# Patient Record
Sex: MALE | Race: BLACK OR AFRICAN AMERICAN | NOT HISPANIC OR LATINO | URBAN - METROPOLITAN AREA
[De-identification: names, ages, dates, MRNs, and addresses within clinical notes are randomized per-mention and may not be internally consistent; named-entity substitution may affect disease eponyms.]

---

## 2021-03-11 ENCOUNTER — INPATIENT (INPATIENT)
Facility: HOSPITAL | Age: 41
LOS: 4 days | Discharge: HOME | DRG: 177 | End: 2021-03-16
Attending: INTERNAL MEDICINE | Admitting: INTERNAL MEDICINE
Payer: COMMERCIAL

## 2021-03-11 VITALS
RESPIRATION RATE: 18 BRPM | OXYGEN SATURATION: 98 % | WEIGHT: 304.02 LBS | HEART RATE: 93 BPM | TEMPERATURE: 98 F | SYSTOLIC BLOOD PRESSURE: 130 MMHG | DIASTOLIC BLOOD PRESSURE: 92 MMHG | HEIGHT: 75 IN

## 2021-03-11 DIAGNOSIS — E11.9 TYPE 2 DIABETES MELLITUS WITHOUT COMPLICATIONS: ICD-10-CM

## 2021-03-11 DIAGNOSIS — J96.01 ACUTE RESPIRATORY FAILURE WITH HYPOXIA: ICD-10-CM

## 2021-03-11 DIAGNOSIS — R09.02 HYPOXEMIA: ICD-10-CM

## 2021-03-11 DIAGNOSIS — Z29.9 ENCOUNTER FOR PROPHYLACTIC MEASURES, UNSPECIFIED: ICD-10-CM

## 2021-03-11 LAB
ALBUMIN SERPL ELPH-MCNC: 3 G/DL — LOW (ref 3.5–5)
ALP SERPL-CCNC: 54 U/L — SIGNIFICANT CHANGE UP (ref 40–120)
ALT FLD-CCNC: 52 U/L DA — SIGNIFICANT CHANGE UP (ref 10–60)
ANION GAP SERPL CALC-SCNC: 10 MMOL/L — SIGNIFICANT CHANGE UP (ref 5–17)
AST SERPL-CCNC: 62 U/L — HIGH (ref 10–40)
BASOPHILS # BLD AUTO: 0.01 K/UL — SIGNIFICANT CHANGE UP (ref 0–0.2)
BASOPHILS NFR BLD AUTO: 0.1 % — SIGNIFICANT CHANGE UP (ref 0–2)
BILIRUB SERPL-MCNC: 0.4 MG/DL — SIGNIFICANT CHANGE UP (ref 0.2–1.2)
BUN SERPL-MCNC: 12 MG/DL — SIGNIFICANT CHANGE UP (ref 7–18)
CALCIUM SERPL-MCNC: 9.2 MG/DL — SIGNIFICANT CHANGE UP (ref 8.4–10.5)
CHLORIDE SERPL-SCNC: 98 MMOL/L — SIGNIFICANT CHANGE UP (ref 96–108)
CO2 SERPL-SCNC: 24 MMOL/L — SIGNIFICANT CHANGE UP (ref 22–31)
CREAT SERPL-MCNC: 1.16 MG/DL — SIGNIFICANT CHANGE UP (ref 0.5–1.3)
D DIMER BLD IA.RAPID-MCNC: 185 NG/ML DDU — SIGNIFICANT CHANGE UP
EOSINOPHIL # BLD AUTO: 0.13 K/UL — SIGNIFICANT CHANGE UP (ref 0–0.5)
EOSINOPHIL NFR BLD AUTO: 1.7 % — SIGNIFICANT CHANGE UP (ref 0–6)
GLUCOSE SERPL-MCNC: 256 MG/DL — HIGH (ref 70–99)
HCT VFR BLD CALC: 43.2 % — SIGNIFICANT CHANGE UP (ref 39–50)
HCT VFR BLD CALC: 46.8 % — SIGNIFICANT CHANGE UP (ref 39–50)
HGB BLD-MCNC: 14.3 G/DL — SIGNIFICANT CHANGE UP (ref 13–17)
HGB BLD-MCNC: 15.5 G/DL — SIGNIFICANT CHANGE UP (ref 13–17)
IMM GRANULOCYTES NFR BLD AUTO: 0.4 % — SIGNIFICANT CHANGE UP (ref 0–1.5)
LYMPHOCYTES # BLD AUTO: 1.04 K/UL — SIGNIFICANT CHANGE UP (ref 1–3.3)
LYMPHOCYTES # BLD AUTO: 13.3 % — SIGNIFICANT CHANGE UP (ref 13–44)
MCHC RBC-ENTMCNC: 29 PG — SIGNIFICANT CHANGE UP (ref 27–34)
MCHC RBC-ENTMCNC: 29.2 PG — SIGNIFICANT CHANGE UP (ref 27–34)
MCHC RBC-ENTMCNC: 33.1 GM/DL — SIGNIFICANT CHANGE UP (ref 32–36)
MCHC RBC-ENTMCNC: 33.1 GM/DL — SIGNIFICANT CHANGE UP (ref 32–36)
MCV RBC AUTO: 87.6 FL — SIGNIFICANT CHANGE UP (ref 80–100)
MCV RBC AUTO: 88.1 FL — SIGNIFICANT CHANGE UP (ref 80–100)
MONOCYTES # BLD AUTO: 0.44 K/UL — SIGNIFICANT CHANGE UP (ref 0–0.9)
MONOCYTES NFR BLD AUTO: 5.6 % — SIGNIFICANT CHANGE UP (ref 2–14)
NEUTROPHILS # BLD AUTO: 6.18 K/UL — SIGNIFICANT CHANGE UP (ref 1.8–7.4)
NEUTROPHILS NFR BLD AUTO: 78.9 % — HIGH (ref 43–77)
NRBC # BLD: 0 /100 WBCS — SIGNIFICANT CHANGE UP (ref 0–0)
PLATELET # BLD AUTO: 157 K/UL — SIGNIFICANT CHANGE UP (ref 150–400)
PLATELET # BLD AUTO: SIGNIFICANT CHANGE UP K/UL (ref 150–400)
POTASSIUM SERPL-MCNC: 4.4 MMOL/L — SIGNIFICANT CHANGE UP (ref 3.5–5.3)
POTASSIUM SERPL-SCNC: 4.4 MMOL/L — SIGNIFICANT CHANGE UP (ref 3.5–5.3)
PROT SERPL-MCNC: 8.1 G/DL — SIGNIFICANT CHANGE UP (ref 6–8.3)
RBC # BLD: 4.93 M/UL — SIGNIFICANT CHANGE UP (ref 4.2–5.8)
RBC # BLD: 5.31 M/UL — SIGNIFICANT CHANGE UP (ref 4.2–5.8)
RBC # FLD: 13.2 % — SIGNIFICANT CHANGE UP (ref 10.3–14.5)
RBC # FLD: 13.4 % — SIGNIFICANT CHANGE UP (ref 10.3–14.5)
SARS-COV-2 RNA SPEC QL NAA+PROBE: DETECTED
SODIUM SERPL-SCNC: 132 MMOL/L — LOW (ref 135–145)
TROPONIN I SERPL-MCNC: <0.015 NG/ML — SIGNIFICANT CHANGE UP (ref 0–0.04)
WBC # BLD: 7.15 K/UL — SIGNIFICANT CHANGE UP (ref 3.8–10.5)
WBC # BLD: 7.83 K/UL — SIGNIFICANT CHANGE UP (ref 3.8–10.5)
WBC # FLD AUTO: 7.15 K/UL — SIGNIFICANT CHANGE UP (ref 3.8–10.5)
WBC # FLD AUTO: 7.83 K/UL — SIGNIFICANT CHANGE UP (ref 3.8–10.5)

## 2021-03-11 PROCEDURE — 71045 X-RAY EXAM CHEST 1 VIEW: CPT | Mod: 26

## 2021-03-11 PROCEDURE — 99285 EMERGENCY DEPT VISIT HI MDM: CPT

## 2021-03-11 PROCEDURE — 99223 1ST HOSP IP/OBS HIGH 75: CPT

## 2021-03-11 RX ORDER — DEXAMETHASONE 0.5 MG/5ML
6 ELIXIR ORAL DAILY
Refills: 0 | Status: DISCONTINUED | OUTPATIENT
Start: 2021-03-11 | End: 2021-03-16

## 2021-03-11 RX ORDER — ENOXAPARIN SODIUM 100 MG/ML
40 INJECTION SUBCUTANEOUS DAILY
Refills: 0 | Status: DISCONTINUED | OUTPATIENT
Start: 2021-03-11 | End: 2021-03-12

## 2021-03-11 RX ORDER — INSULIN LISPRO 100/ML
VIAL (ML) SUBCUTANEOUS
Refills: 0 | Status: DISCONTINUED | OUTPATIENT
Start: 2021-03-11 | End: 2021-03-16

## 2021-03-11 RX ORDER — REMDESIVIR 5 MG/ML
100 INJECTION INTRAVENOUS EVERY 24 HOURS
Refills: 0 | Status: DISCONTINUED | OUTPATIENT
Start: 2021-03-11 | End: 2021-03-11

## 2021-03-11 RX ORDER — SODIUM CHLORIDE 9 MG/ML
1000 INJECTION INTRAMUSCULAR; INTRAVENOUS; SUBCUTANEOUS ONCE
Refills: 0 | Status: COMPLETED | OUTPATIENT
Start: 2021-03-11 | End: 2021-03-11

## 2021-03-11 RX ORDER — ACETAMINOPHEN 500 MG
975 TABLET ORAL ONCE
Refills: 0 | Status: COMPLETED | OUTPATIENT
Start: 2021-03-11 | End: 2021-03-11

## 2021-03-11 RX ORDER — KETOROLAC TROMETHAMINE 30 MG/ML
15 SYRINGE (ML) INJECTION ONCE
Refills: 0 | Status: DISCONTINUED | OUTPATIENT
Start: 2021-03-11 | End: 2021-03-11

## 2021-03-11 RX ADMIN — Medication 975 MILLIGRAM(S): at 17:50

## 2021-03-11 RX ADMIN — Medication 15 MILLIGRAM(S): at 17:50

## 2021-03-11 RX ADMIN — Medication 975 MILLIGRAM(S): at 19:00

## 2021-03-11 RX ADMIN — Medication 100 MILLIGRAM(S): at 17:50

## 2021-03-11 RX ADMIN — SODIUM CHLORIDE 1000 MILLILITER(S): 9 INJECTION INTRAMUSCULAR; INTRAVENOUS; SUBCUTANEOUS at 17:51

## 2021-03-11 RX ADMIN — Medication 15 MILLIGRAM(S): at 18:20

## 2021-03-11 NOTE — H&P ADULT - PROBLEM SELECTOR PLAN 1
p/w fever, cough and SOB  COVID positive  CXR shows b/l infiltrates(f/u official read)  Troponin negative  D-dimer wnl  Saturating 96% on 2L NC  started on Decadron  f/u Procalcitonin, CRP, ferritin  Monitor oxygen saturation p/w fever and cough   COVID positive  CXR shows b/l infiltrates(f/u official read)  Troponin negative  D-dimer wnl  Saturating 96% on 2L NC  started on Decadron  f/u Procalcitonin, CRP, ferritin  Monitor oxygen saturation

## 2021-03-11 NOTE — H&P ADULT - ATTENDING COMMENTS
Pt seen and examined  Case discussed with MAR.  Agree with HPI as above    40 year old man with PMH of DM2 here on account of 1 week of progressive Pt seen and examined  Case discussed with MAR.  Agree with HPI as above    40 year old man with PMH of DM2 here on account of 1 week of progressive SOB , fever and malaise. He was noted with low SaO2 in his PCP's office and sent to the ED.    Vital Signs Last 24 Hrs  T(C): 37.4 (11 Mar 2021 19:29), Max: 37.4 (11 Mar 2021 19:29)  T(F): 99.3 (11 Mar 2021 19:29), Max: 99.3 (11 Mar 2021 19:29)  HR: 75 (11 Mar 2021 19:29) (75 - 93)  BP: 121/80 (11 Mar 2021 19:29) (121/80 - 130/92)  RR: 19 (11 Mar 2021 19:29) (18 - 19)  SpO2: 93% (11 Mar 2021 19:29) (91% - 98%)    Exams    Labs                        14.3   7.83  )-----------( 157      ( 11 Mar 2021 19:38 )             43.2       03-11    132<L>  |  98  |  12  ------------------------<  256<H>  4.4   |  24  |  1.16    Ca    9.2      11 Mar 2021 17:47    TPro  8.1  /  Alb  3.0<L>  /  TBili  0.4  /  DBili  x   /  AST  62<H>  /  ALT  52  /  AlkPhos  54  03-11    COVID-19 PCR: Detected (11 Mar 2021 17:47)    cxr - independent assessment  Extensive  B/L interstitial infiltrates    Impression  - Pt seen and examined  Case discussed with MAR.  Agree with HPI as above    40 year old man with PMH of DM2 here on account of 1 week of progressive SOB , fever and malaise. He was noted with low SaO2 in his PCP's office and sent to the ED.    Vital Signs Last 24 Hrs  T(C): 37.4 (11 Mar 2021 19:29), Max: 37.4 (11 Mar 2021 19:29)  T(F): 99.3 (11 Mar 2021 19:29), Max: 99.3 (11 Mar 2021 19:29)  HR: 75 (11 Mar 2021 19:29) (75 - 93)  BP: 121/80 (11 Mar 2021 19:29) (121/80 - 130/92)  RR: 19 (11 Mar 2021 19:29) (18 - 19)  SpO2: 93% (11 Mar 2021 19:29) (91% - 98%)    Exams  Young man , lying in bed not in acute distress with SaO2 93% at rest but drops just with slight exertion.  Improves to 96% on 2LPM.  B/L coarse crackles in both lung fields  RRR S1S2 only    Labs                        14.3   7.83  )-----------( 157      ( 11 Mar 2021 19:38 )             43.2       03-11    132<L>  |  98  |  12  ------------------------<  256<H>  4.4   |  24  |  1.16    Ca    9.2      11 Mar 2021 17:47    TPro  8.1  /  Alb  3.0<L>  /  TBili  0.4  /  DBili  x   /  AST  62<H>  /  ALT  52  /  AlkPhos  54  03-11    COVID-19 PCR: Detected (11 Mar 2021 17:47)    cxr - independent assessment  Extensive  B/L interstitial infiltrates    Impression  - Acute respiratory failure with hypoxia   - B/L pneumonia from COVID 19 infection  - DM2    Plan   - Admit to Medicine with airborne and contact precaution.  - Supplemental O2 to keep SaO2 > 96%  -Dexamethasone 6mg IV daily  - Remdesivir protocol  - Check Covid 19 antibodies  - Supportive care   - Hold metformin and place RISS and low dose long acting insulin  - POC glucose qac and qhs

## 2021-03-11 NOTE — ED PROVIDER NOTE - CLINICAL SUMMARY MEDICAL DECISION MAKING FREE TEXT BOX
Pt with bilateral infiltrates, pulse ox of 91% on RA, goes lower with exertion, COVID+, requires admission for supplemental oxygen.

## 2021-03-11 NOTE — ED PROVIDER NOTE - CARE PLAN
Principal Discharge DX:	Hypoxia  Secondary Diagnosis:	Viral pneumonia  Secondary Diagnosis:	COVID-19  Secondary Diagnosis:	Diabetes

## 2021-03-11 NOTE — H&P ADULT - PROBLEM SELECTOR PLAN 2
on HSS  Monitor fingerstick  f/u HbA1C Takes metformin at home   on HSS  Monitor fingerstick  f/u HbA1C

## 2021-03-11 NOTE — H&P ADULT - ASSESSMENT
40 yr male with a PMHX of DM and no PSHx presents to the ED with complaints of fever and cough for x1 week. Patient states he saw his PMD today, where he was noted hypoxic to 86% on room air and had a negative rapid COVID test in the office. Pt denies any chest pain, N/V/D, abdominal pain, urinary symptoms or any other acute complaints.    In ED, /80, HR 75, SaO2 96% on 2L NC    Pt admitted for COVID pneumonia

## 2021-03-11 NOTE — ED PROVIDER NOTE - OBJECTIVE STATEMENT
40 y.o male with a PMHX of DM and no PSHx presents to the ED c.o fever, cough, fatigue and shortness of breath for x1 week. Patient states he saw his PMD today, where he was noted hypoxic and had a negative rapid COVID test in the office. Patient denies any other acute complaints. NKDA

## 2021-03-11 NOTE — ED ADULT NURSE NOTE - OBJECTIVE STATEMENT
Patient presents to ED with c/o fever, body aches and general malaise for about 7days. Patient A&Ox4, noted to have dry cough, patient denies difficulty breathing. O2sat 94% on room air.

## 2021-03-11 NOTE — H&P ADULT - HISTORY OF PRESENT ILLNESS
40 yr male with a PMHX of DM and no PSHx presents to the ED with complaints of fever, cough and shortness of breath for x1 week. Patient states he saw his PMD today, where he was noted hypoxic to 86% on room air and had a negative rapid COVID test in the office. Pt denies any chest pain, N/V/D, abdominal pain, urinary symptoms or any other acute complaints.    In ED, /80, HR 75, SaO2 96% on 2L NC 40 yr male with a PMHX of DM and no PSHx presents to the ED with complaints of fever and cough for x1 week. Patient states he saw his PMD today, where he was noted hypoxic to 86% on room air and had a negative rapid COVID test in the office. Pt denies any chest pain, N/V/D, abdominal pain, urinary symptoms or any other acute complaints.    In ED, /80, HR 75, SaO2 96% on 2L NC

## 2021-03-11 NOTE — ED PROVIDER NOTE - MUSCULOSKELETAL, MLM
Spine appears normal, range of motion is not limited, no muscle or joint tenderness, no edema no calf tenderness

## 2021-03-12 LAB
A1C WITH ESTIMATED AVERAGE GLUCOSE RESULT: >15.5 % — HIGH (ref 4–5.6)
ALBUMIN SERPL ELPH-MCNC: 2.8 G/DL — LOW (ref 3.5–5)
ALBUMIN SERPL ELPH-MCNC: 3 G/DL — LOW (ref 3.5–5)
ALP SERPL-CCNC: 49 U/L — SIGNIFICANT CHANGE UP (ref 40–120)
ALP SERPL-CCNC: 54 U/L — SIGNIFICANT CHANGE UP (ref 40–120)
ALT FLD-CCNC: 54 U/L DA — SIGNIFICANT CHANGE UP (ref 10–60)
ALT FLD-CCNC: 63 U/L DA — HIGH (ref 10–60)
ANION GAP SERPL CALC-SCNC: 11 MMOL/L — SIGNIFICANT CHANGE UP (ref 5–17)
AST SERPL-CCNC: 53 U/L — HIGH (ref 10–40)
AST SERPL-CCNC: 64 U/L — HIGH (ref 10–40)
BASOPHILS # BLD AUTO: 0.01 K/UL — SIGNIFICANT CHANGE UP (ref 0–0.2)
BASOPHILS NFR BLD AUTO: 0.1 % — SIGNIFICANT CHANGE UP (ref 0–2)
BILIRUB DIRECT SERPL-MCNC: 0.1 MG/DL — SIGNIFICANT CHANGE UP (ref 0–0.2)
BILIRUB INDIRECT FLD-MCNC: 0.1 MG/DL — LOW (ref 0.2–1)
BILIRUB SERPL-MCNC: 0.2 MG/DL — SIGNIFICANT CHANGE UP (ref 0.2–1.2)
BILIRUB SERPL-MCNC: 0.3 MG/DL — SIGNIFICANT CHANGE UP (ref 0.2–1.2)
BUN SERPL-MCNC: 12 MG/DL — SIGNIFICANT CHANGE UP (ref 7–18)
CALCIUM SERPL-MCNC: 8.6 MG/DL — SIGNIFICANT CHANGE UP (ref 8.4–10.5)
CHLORIDE SERPL-SCNC: 102 MMOL/L — SIGNIFICANT CHANGE UP (ref 96–108)
CHOLEST SERPL-MCNC: 172 MG/DL — SIGNIFICANT CHANGE UP
CO2 SERPL-SCNC: 22 MMOL/L — SIGNIFICANT CHANGE UP (ref 22–31)
CREAT SERPL-MCNC: 0.89 MG/DL — SIGNIFICANT CHANGE UP (ref 0.5–1.3)
CREAT SERPL-MCNC: 1.17 MG/DL — SIGNIFICANT CHANGE UP (ref 0.5–1.3)
CRP SERPL-MCNC: 94 MG/L — HIGH
EOSINOPHIL # BLD AUTO: 0 K/UL — SIGNIFICANT CHANGE UP (ref 0–0.5)
EOSINOPHIL NFR BLD AUTO: 0 % — SIGNIFICANT CHANGE UP (ref 0–6)
ERYTHROCYTE [SEDIMENTATION RATE] IN BLOOD: 29 MM/HR — HIGH (ref 0–15)
ESTIMATED AVERAGE GLUCOSE: >398 MG/DL — HIGH (ref 68–114)
FERRITIN SERPL-MCNC: 1138 NG/ML — HIGH (ref 30–400)
FERRITIN SERPL-MCNC: 1199 NG/ML — HIGH (ref 30–400)
GLUCOSE BLDC GLUCOMTR-MCNC: 230 MG/DL — HIGH (ref 70–99)
GLUCOSE BLDC GLUCOMTR-MCNC: 256 MG/DL — HIGH (ref 70–99)
GLUCOSE BLDC GLUCOMTR-MCNC: 317 MG/DL — HIGH (ref 70–99)
GLUCOSE SERPL-MCNC: 210 MG/DL — HIGH (ref 70–99)
HCT VFR BLD CALC: 42.7 % — SIGNIFICANT CHANGE UP (ref 39–50)
HDLC SERPL-MCNC: 40 MG/DL — LOW
HGB BLD-MCNC: 14.3 G/DL — SIGNIFICANT CHANGE UP (ref 13–17)
IMM GRANULOCYTES NFR BLD AUTO: 0.5 % — SIGNIFICANT CHANGE UP (ref 0–1.5)
INR BLD: 1.21 RATIO — HIGH (ref 0.88–1.16)
LIPID PNL WITH DIRECT LDL SERPL: 100 MG/DL — HIGH
LYMPHOCYTES # BLD AUTO: 1.5 K/UL — SIGNIFICANT CHANGE UP (ref 1–3.3)
LYMPHOCYTES # BLD AUTO: 17.8 % — SIGNIFICANT CHANGE UP (ref 13–44)
MAGNESIUM SERPL-MCNC: 2.1 MG/DL — SIGNIFICANT CHANGE UP (ref 1.6–2.6)
MANUAL SMEAR VERIFICATION: SIGNIFICANT CHANGE UP
MCHC RBC-ENTMCNC: 29.1 PG — SIGNIFICANT CHANGE UP (ref 27–34)
MCHC RBC-ENTMCNC: 33.5 GM/DL — SIGNIFICANT CHANGE UP (ref 32–36)
MCV RBC AUTO: 86.8 FL — SIGNIFICANT CHANGE UP (ref 80–100)
MONOCYTES # BLD AUTO: 0.45 K/UL — SIGNIFICANT CHANGE UP (ref 0–0.9)
MONOCYTES NFR BLD AUTO: 5.3 % — SIGNIFICANT CHANGE UP (ref 2–14)
NEUTROPHILS # BLD AUTO: 6.45 K/UL — SIGNIFICANT CHANGE UP (ref 1.8–7.4)
NEUTROPHILS NFR BLD AUTO: 76.3 % — SIGNIFICANT CHANGE UP (ref 43–77)
NON HDL CHOLESTEROL: 132 MG/DL — HIGH
NRBC # BLD: 0 /100 WBCS — SIGNIFICANT CHANGE UP (ref 0–0)
PHOSPHATE SERPL-MCNC: 3.3 MG/DL — SIGNIFICANT CHANGE UP (ref 2.5–4.5)
PLAT MORPH BLD: NORMAL — SIGNIFICANT CHANGE UP
PLATELET # BLD AUTO: 177 K/UL — SIGNIFICANT CHANGE UP (ref 150–400)
PLATELET COUNT - ESTIMATE: NORMAL — SIGNIFICANT CHANGE UP
POTASSIUM SERPL-MCNC: 3.8 MMOL/L — SIGNIFICANT CHANGE UP (ref 3.5–5.3)
POTASSIUM SERPL-SCNC: 3.8 MMOL/L — SIGNIFICANT CHANGE UP (ref 3.5–5.3)
PROCALCITONIN SERPL-MCNC: 0.21 NG/ML — HIGH (ref 0.02–0.1)
PROT SERPL-MCNC: 7.1 G/DL — SIGNIFICANT CHANGE UP (ref 6–8.3)
PROT SERPL-MCNC: 7.8 G/DL — SIGNIFICANT CHANGE UP (ref 6–8.3)
PROTHROM AB SERPL-ACNC: 14.3 SEC — HIGH (ref 10.6–13.6)
RBC # BLD: 4.92 M/UL — SIGNIFICANT CHANGE UP (ref 4.2–5.8)
RBC # FLD: 13.3 % — SIGNIFICANT CHANGE UP (ref 10.3–14.5)
RBC BLD AUTO: NORMAL — SIGNIFICANT CHANGE UP
SARS-COV-2 IGG SERPL QL IA: NEGATIVE — SIGNIFICANT CHANGE UP
SARS-COV-2 IGM SERPL IA-ACNC: 0.11 INDEX — SIGNIFICANT CHANGE UP
SODIUM SERPL-SCNC: 135 MMOL/L — SIGNIFICANT CHANGE UP (ref 135–145)
TRIGL SERPL-MCNC: 160 MG/DL — HIGH
TSH SERPL-MCNC: 1.05 UU/ML — SIGNIFICANT CHANGE UP (ref 0.34–4.82)
VIT B12 SERPL-MCNC: 1412 PG/ML — HIGH (ref 232–1245)
WBC # BLD: 8.45 K/UL — SIGNIFICANT CHANGE UP (ref 3.8–10.5)
WBC # FLD AUTO: 8.45 K/UL — SIGNIFICANT CHANGE UP (ref 3.8–10.5)

## 2021-03-12 PROCEDURE — 99233 SBSQ HOSP IP/OBS HIGH 50: CPT | Mod: GC

## 2021-03-12 RX ORDER — ACETAMINOPHEN 500 MG
650 TABLET ORAL EVERY 6 HOURS
Refills: 0 | Status: DISCONTINUED | OUTPATIENT
Start: 2021-03-12 | End: 2021-03-16

## 2021-03-12 RX ORDER — PANTOPRAZOLE SODIUM 20 MG/1
40 TABLET, DELAYED RELEASE ORAL
Refills: 0 | Status: DISCONTINUED | OUTPATIENT
Start: 2021-03-12 | End: 2021-03-16

## 2021-03-12 RX ORDER — INFLUENZA VIRUS VACCINE 15; 15; 15; 15 UG/.5ML; UG/.5ML; UG/.5ML; UG/.5ML
0.5 SUSPENSION INTRAMUSCULAR ONCE
Refills: 0 | Status: DISCONTINUED | OUTPATIENT
Start: 2021-03-12 | End: 2021-03-16

## 2021-03-12 RX ORDER — REMDESIVIR 5 MG/ML
100 INJECTION INTRAVENOUS EVERY 24 HOURS
Refills: 0 | Status: COMPLETED | OUTPATIENT
Start: 2021-03-13 | End: 2021-03-16

## 2021-03-12 RX ORDER — REMDESIVIR 5 MG/ML
INJECTION INTRAVENOUS
Refills: 0 | Status: COMPLETED | OUTPATIENT
Start: 2021-03-12 | End: 2021-03-16

## 2021-03-12 RX ORDER — ACETAMINOPHEN 500 MG
650 TABLET ORAL ONCE
Refills: 0 | Status: COMPLETED | OUTPATIENT
Start: 2021-03-12 | End: 2021-03-12

## 2021-03-12 RX ORDER — IBUPROFEN 200 MG
600 TABLET ORAL ONCE
Refills: 0 | Status: COMPLETED | OUTPATIENT
Start: 2021-03-12 | End: 2021-03-12

## 2021-03-12 RX ORDER — ENOXAPARIN SODIUM 100 MG/ML
40 INJECTION SUBCUTANEOUS EVERY 12 HOURS
Refills: 0 | Status: DISCONTINUED | OUTPATIENT
Start: 2021-03-12 | End: 2021-03-16

## 2021-03-12 RX ORDER — REMDESIVIR 5 MG/ML
200 INJECTION INTRAVENOUS EVERY 24 HOURS
Refills: 0 | Status: COMPLETED | OUTPATIENT
Start: 2021-03-12 | End: 2021-03-12

## 2021-03-12 RX ADMIN — Medication 650 MILLIGRAM(S): at 14:23

## 2021-03-12 RX ADMIN — Medication 650 MILLIGRAM(S): at 23:00

## 2021-03-12 RX ADMIN — ENOXAPARIN SODIUM 40 MILLIGRAM(S): 100 INJECTION SUBCUTANEOUS at 17:58

## 2021-03-12 RX ADMIN — Medication 3: at 16:34

## 2021-03-12 RX ADMIN — REMDESIVIR 500 MILLIGRAM(S): 5 INJECTION INTRAVENOUS at 11:06

## 2021-03-12 RX ADMIN — Medication 650 MILLIGRAM(S): at 21:35

## 2021-03-12 RX ADMIN — Medication 650 MILLIGRAM(S): at 04:30

## 2021-03-12 RX ADMIN — Medication 4: at 11:39

## 2021-03-12 RX ADMIN — Medication 650 MILLIGRAM(S): at 03:07

## 2021-03-12 RX ADMIN — Medication 650 MILLIGRAM(S): at 13:45

## 2021-03-12 RX ADMIN — Medication 2: at 08:24

## 2021-03-12 RX ADMIN — Medication 6 MILLIGRAM(S): at 05:51

## 2021-03-12 RX ADMIN — PANTOPRAZOLE SODIUM 40 MILLIGRAM(S): 20 TABLET, DELAYED RELEASE ORAL at 11:06

## 2021-03-12 NOTE — PROGRESS NOTE ADULT - ASSESSMENT
40 yr male with a PMHX of DM and no PSHx presents to the ED with complaints of fever and cough for x1 week. Patient states he saw his PMD today, where he was noted hypoxic to 86% on room air and had a negative rapid COVID test in the office. Pt denies any chest pain, N/V/D, abdominal pain, urinary symptoms or any other acute complaints. Admitted for acute hypoxic respiratory failure 2/2 COVID pneumonia

## 2021-03-12 NOTE — CHART NOTE - NSCHARTNOTEFT_GEN_A_CORE
EVENT: Paged by RN, temp 102.5    HPI:  40 yr male with a PMHX of DM and no PSHx presents to the ED with complaints of fever and cough for x1 week. Patient states he saw his PMD today, where he was noted hypoxic to 86% on room air and had a negative rapid COVID test in the office. Pt denies any chest pain, N/V/D, abdominal pain, urinary symptoms or any other acute complaints. In ED, /80, HR 75, SaO2 96% on 2L NC. Pt admitted for COVID pneumonia.       SUBJECTIVE: No complaints    OBJECTIVE:  Vital Signs Last 24 Hrs  T(C): 39.2 (12 Mar 2021 00:40), Max: 39.2 (12 Mar 2021 00:40)  T(F): 102.5 (12 Mar 2021 00:40), Max: 102.5 (12 Mar 2021 00:40)  HR: 83 (12 Mar 2021 00:40) (75 - 93)  BP: 148/92 (12 Mar 2021 00:40) (121/80 - 148/92)  BP(mean): --  RR: 18 (12 Mar 2021 00:40) (18 - 19)  SpO2: 98% (12 Mar 2021 00:40) (91% - 98%)    PHYSICAL EXAM:  Neuro: Awake and alert, oriented to person, place, and time  Cardiovascular: + S1, S2, no murmurs, rubs, or bruits  Respiratory: clear to auscultation bilaterally with good air entry   GI: Abdomen soft, non-tender, bowel sounds present   : Non distended;   Skin: warm and dry; no rash      LABS:                        14.3   7.83  )-----------( 157      ( 11 Mar 2021 19:38 )             43.2   CARDIAC MARKERS ( 11 Mar 2021 19:38 )  <0.015 ng/mL / x     / x     / x     / x        03-11    132<L>  |  98  |  12  ----------------------------<  256<H>  4.4   |  24  |  1.16    Ca    9.2      11 Mar 2021 17:47    TPro  8.1  /  Alb  3.0<L>  /  TBili  0.4  /  DBili  x   /  AST  62<H>  /  ALT  52  /  AlkPhos  54  03-11        EKG:   IMAGING:    ASSESSMENT: Fever likely due to covid-19 pneumonia     PLAN:     -Tylenol 650 mg PO x 1 dose, ordered  -Apply cool packs to extremities  -Cooling blanket    FOLLOW UP / RESULT:     -Monitor effectiveness of above intervention  -Reassess temp per hospital policy

## 2021-03-12 NOTE — PROGRESS NOTE ADULT - SUBJECTIVE AND OBJECTIVE BOX
PGY-1 Progress Note discussed with attending    PAGER #: [[phone]] TILL 5:00 PM  PLEASE CONTACT ON CALL TEAM:  - On Call Team (Please refer to Aria) FROM 5:00 PM - 8:30PM  - Nightfloat Team FROM 8:30 -7:30 AM    INTERVAL HPI  - 40 yr male with a PMHX of DM and no PSHx presents to the ED with complaints of fever and cough for x1 week. Patient states he saw his PMD today, where he was noted hypoxic to 86% on room air and had a negative rapid COVID test in the office. Pt denies any chest pain, N/V/D, abdominal pain, urinary symptoms or any other acute complaints. Admitted for acute hypoxic respiratory failure 2/2 COVID. Patient started on oxygen supplementation. CXR showed bilateral infiltrate, consistent with COVID. D-dimer and troponin are wnl.    OVERNIGHT EVENTS:   - spike fever overnight, s/p tylenolol. Patient reports improved respiratory function; however, he complains of mild pleuritic chest pain.    REVIEW OF SYSTEMS:  CONSTITUTIONAL: complains of fever; no weight loss or fatigue  RESPIRATORY: improved SOB; No cough, wheezing, chills or hemoptysis;  CARDIOVASCULAR: No chest pain, palpitations, dizziness, or leg swelling  GASTROINTESTINAL: No abdominal pain. No nausea, vomiting, or hematemesis; No diarrhea or constipation. No melena or hematochezia.  GENITOURINARY: No dysuria or hematuria, urinary frequency  NEUROLOGICAL: No headaches, memory loss, loss of strength, numbness, or tremors  SKIN: No itching, burning, rashes, or lesions     MEDICATIONS  (STANDING):  dexAMETHasone  Injectable 6 milliGRAM(s) IV Push daily  enoxaparin Injectable 40 milliGRAM(s) SubCutaneous every 12 hours  influenza   Vaccine 0.5 milliLiter(s) IntraMuscular once  insulin lispro (ADMELOG) corrective regimen sliding scale   SubCutaneous three times a day before meals  pantoprazole    Tablet 40 milliGRAM(s) Oral before breakfast  remdesivir  IVPB   IV Intermittent   remdesivir  IVPB 200 milliGRAM(s) IV Intermittent every 24 hours    MEDICATIONS  (PRN):  acetaminophen   Tablet .. 650 milliGRAM(s) Oral every 6 hours PRN Temp greater or equal to 38.5C (101.3F)      Vital Signs Last 24 Hrs  T(C): 37.2 (12 Mar 2021 05:07), Max: 39.2 (12 Mar 2021 00:40)  T(F): 99 (12 Mar 2021 05:07), Max: 102.5 (12 Mar 2021 00:40)  HR: 84 (12 Mar 2021 05:07) (75 - 93)  BP: 130/80 (12 Mar 2021 05:07) (121/80 - 148/92)  BP(mean): --  RR: 18 (12 Mar 2021 05:07) (18 - 20)  SpO2: 95% (12 Mar 2021 05:07) (91% - 98%)    PHYSICAL EXAMINATION:  GENERAL: NAD, AAOx3  HEAD: AT/NC  EYES: conjunctiva and sclera clear  NECK: supple, No JVD noted, Normal thyroid  CHEST/LUNG: CTABL, but mild decreased breath sounds noted on the base b/l. On 2L via NC; no rales, rhonchi, wheezing, or rubs  HEART: regular rate and rhythm; no murmurs, rubs, or gallops  ABDOMEN: soft, nontender, nondistended; Bowel sounds present  EXTREMITIES:  2+ Peripheral Pulses, No clubbing, cyanosis, or edema  SKIN: warm dry                          14.3   x     )-----------( 177      ( 12 Mar 2021 06:53 )             42.7     03-12    135  |  102  |  12  ----------------------------<  210<H>  3.8   |  22  |  0.89    Ca    8.6      12 Mar 2021 06:53  Phos  3.3     03-12  Mg     2.1     03-12    TPro  7.1  /  Alb  2.8<L>  /  TBili  0.3  /  DBili  x   /  AST  53<H>  /  ALT  54  /  AlkPhos  49  03-12    LIVER FUNCTIONS - ( 12 Mar 2021 06:53 )  Alb: 2.8 g/dL / Pro: 7.1 g/dL / ALK PHOS: 49 U/L / ALT: 54 U/L DA / AST: 53 U/L / GGT: x           CARDIAC MARKERS ( 11 Mar 2021 19:38 )  <0.015 ng/mL / x     / x     / x     / x            COVID-19 PCR: Detected (11 Mar 2021 17:47)      CAPILLARY BLOOD GLUCOSE      POCT Blood Glucose.: 230 mg/dL (12 Mar 2021 08:22)      RADIOLOGY & ADDITIONAL TESTS:

## 2021-03-13 LAB
ALBUMIN SERPL ELPH-MCNC: 2.6 G/DL — LOW (ref 3.5–5)
ALBUMIN SERPL ELPH-MCNC: 2.6 G/DL — LOW (ref 3.5–5)
ALP SERPL-CCNC: 50 U/L — SIGNIFICANT CHANGE UP (ref 40–120)
ALP SERPL-CCNC: 51 U/L — SIGNIFICANT CHANGE UP (ref 40–120)
ALT FLD-CCNC: 63 U/L DA — HIGH (ref 10–60)
ALT FLD-CCNC: 64 U/L DA — HIGH (ref 10–60)
ANION GAP SERPL CALC-SCNC: 13 MMOL/L — SIGNIFICANT CHANGE UP (ref 5–17)
APTT BLD: 34.4 SEC — SIGNIFICANT CHANGE UP (ref 27.5–35.5)
AST SERPL-CCNC: 59 U/L — HIGH (ref 10–40)
AST SERPL-CCNC: 60 U/L — HIGH (ref 10–40)
BASOPHILS # BLD AUTO: 0.02 K/UL — SIGNIFICANT CHANGE UP (ref 0–0.2)
BASOPHILS NFR BLD AUTO: 0.2 % — SIGNIFICANT CHANGE UP (ref 0–2)
BILIRUB DIRECT SERPL-MCNC: 0.1 MG/DL — SIGNIFICANT CHANGE UP (ref 0–0.2)
BILIRUB INDIRECT FLD-MCNC: 0.3 MG/DL — SIGNIFICANT CHANGE UP (ref 0.2–1)
BILIRUB SERPL-MCNC: 0.3 MG/DL — SIGNIFICANT CHANGE UP (ref 0.2–1.2)
BILIRUB SERPL-MCNC: 0.4 MG/DL — SIGNIFICANT CHANGE UP (ref 0.2–1.2)
BUN SERPL-MCNC: 14 MG/DL — SIGNIFICANT CHANGE UP (ref 7–18)
CALCIUM SERPL-MCNC: 9 MG/DL — SIGNIFICANT CHANGE UP (ref 8.4–10.5)
CHLORIDE SERPL-SCNC: 101 MMOL/L — SIGNIFICANT CHANGE UP (ref 96–108)
CO2 SERPL-SCNC: 23 MMOL/L — SIGNIFICANT CHANGE UP (ref 22–31)
CREAT SERPL-MCNC: 0.93 MG/DL — SIGNIFICANT CHANGE UP (ref 0.5–1.3)
CRP SERPL-MCNC: 107 MG/L — HIGH
CRP SERPL-MCNC: 111 MG/L — HIGH
D DIMER BLD IA.RAPID-MCNC: 217 NG/ML DDU — SIGNIFICANT CHANGE UP
EOSINOPHIL # BLD AUTO: 0 K/UL — SIGNIFICANT CHANGE UP (ref 0–0.5)
EOSINOPHIL NFR BLD AUTO: 0 % — SIGNIFICANT CHANGE UP (ref 0–6)
ERYTHROCYTE [SEDIMENTATION RATE] IN BLOOD: 47 MM/HR — HIGH (ref 0–15)
GLUCOSE BLDC GLUCOMTR-MCNC: 238 MG/DL — HIGH (ref 70–99)
GLUCOSE BLDC GLUCOMTR-MCNC: 257 MG/DL — HIGH (ref 70–99)
GLUCOSE BLDC GLUCOMTR-MCNC: 274 MG/DL — HIGH (ref 70–99)
GLUCOSE BLDC GLUCOMTR-MCNC: 333 MG/DL — HIGH (ref 70–99)
GLUCOSE SERPL-MCNC: 216 MG/DL — HIGH (ref 70–99)
HCT VFR BLD CALC: 42.3 % — SIGNIFICANT CHANGE UP (ref 39–50)
HGB BLD-MCNC: 13.9 G/DL — SIGNIFICANT CHANGE UP (ref 13–17)
IMM GRANULOCYTES NFR BLD AUTO: 0.7 % — SIGNIFICANT CHANGE UP (ref 0–1.5)
INR BLD: 1.17 RATIO — HIGH (ref 0.88–1.16)
LDH SERPL L TO P-CCNC: 463 U/L — HIGH (ref 120–225)
LYMPHOCYTES # BLD AUTO: 1.52 K/UL — SIGNIFICANT CHANGE UP (ref 1–3.3)
LYMPHOCYTES # BLD AUTO: 14 % — SIGNIFICANT CHANGE UP (ref 13–44)
MAGNESIUM SERPL-MCNC: 2 MG/DL — SIGNIFICANT CHANGE UP (ref 1.6–2.6)
MCHC RBC-ENTMCNC: 28.8 PG — SIGNIFICANT CHANGE UP (ref 27–34)
MCHC RBC-ENTMCNC: 32.9 GM/DL — SIGNIFICANT CHANGE UP (ref 32–36)
MCV RBC AUTO: 87.6 FL — SIGNIFICANT CHANGE UP (ref 80–100)
MONOCYTES # BLD AUTO: 0.43 K/UL — SIGNIFICANT CHANGE UP (ref 0–0.9)
MONOCYTES NFR BLD AUTO: 4 % — SIGNIFICANT CHANGE UP (ref 2–14)
NEUTROPHILS # BLD AUTO: 8.77 K/UL — HIGH (ref 1.8–7.4)
NEUTROPHILS NFR BLD AUTO: 81.1 % — HIGH (ref 43–77)
NRBC # BLD: 0 /100 WBCS — SIGNIFICANT CHANGE UP (ref 0–0)
PHOSPHATE SERPL-MCNC: 3.1 MG/DL — SIGNIFICANT CHANGE UP (ref 2.5–4.5)
PLATELET # BLD AUTO: 206 K/UL — SIGNIFICANT CHANGE UP (ref 150–400)
POTASSIUM SERPL-MCNC: 3.9 MMOL/L — SIGNIFICANT CHANGE UP (ref 3.5–5.3)
POTASSIUM SERPL-SCNC: 3.9 MMOL/L — SIGNIFICANT CHANGE UP (ref 3.5–5.3)
PROT SERPL-MCNC: 7.1 G/DL — SIGNIFICANT CHANGE UP (ref 6–8.3)
PROT SERPL-MCNC: 7.2 G/DL — SIGNIFICANT CHANGE UP (ref 6–8.3)
PROTHROM AB SERPL-ACNC: 13.8 SEC — HIGH (ref 10.6–13.6)
RBC # BLD: 4.83 M/UL — SIGNIFICANT CHANGE UP (ref 4.2–5.8)
RBC # FLD: 13.4 % — SIGNIFICANT CHANGE UP (ref 10.3–14.5)
SODIUM SERPL-SCNC: 137 MMOL/L — SIGNIFICANT CHANGE UP (ref 135–145)
WBC # BLD: 10.82 K/UL — HIGH (ref 3.8–10.5)
WBC # FLD AUTO: 10.82 K/UL — HIGH (ref 3.8–10.5)

## 2021-03-13 PROCEDURE — 99233 SBSQ HOSP IP/OBS HIGH 50: CPT

## 2021-03-13 RX ADMIN — Medication 6 MILLIGRAM(S): at 05:24

## 2021-03-13 RX ADMIN — ENOXAPARIN SODIUM 40 MILLIGRAM(S): 100 INJECTION SUBCUTANEOUS at 05:24

## 2021-03-13 RX ADMIN — Medication 4: at 11:40

## 2021-03-13 RX ADMIN — PANTOPRAZOLE SODIUM 40 MILLIGRAM(S): 20 TABLET, DELAYED RELEASE ORAL at 05:25

## 2021-03-13 RX ADMIN — Medication 3: at 17:28

## 2021-03-13 RX ADMIN — ENOXAPARIN SODIUM 40 MILLIGRAM(S): 100 INJECTION SUBCUTANEOUS at 17:29

## 2021-03-13 RX ADMIN — Medication 600 MILLIGRAM(S): at 01:30

## 2021-03-13 RX ADMIN — Medication 3: at 08:36

## 2021-03-13 RX ADMIN — REMDESIVIR 500 MILLIGRAM(S): 5 INJECTION INTRAVENOUS at 11:15

## 2021-03-13 RX ADMIN — Medication 600 MILLIGRAM(S): at 00:23

## 2021-03-13 NOTE — PROGRESS NOTE ADULT - SUBJECTIVE AND OBJECTIVE BOX
Patient seen and examined this morning, states he feels better, improved cough and SOB.    acetaminophen   Tablet .. 650 milliGRAM(s) Oral every 6 hours PRN  dexAMETHasone  Injectable 6 milliGRAM(s) IV Push daily  enoxaparin Injectable 40 milliGRAM(s) SubCutaneous every 12 hours  influenza   Vaccine 0.5 milliLiter(s) IntraMuscular once  insulin lispro (ADMELOG) corrective regimen sliding scale   SubCutaneous three times a day before meals  pantoprazole    Tablet 40 milliGRAM(s) Oral before breakfast  remdesivir  IVPB   IV Intermittent   remdesivir  IVPB 100 milliGRAM(s) IV Intermittent every 24 hours      VITALS:  Vital Signs Last 24 Hrs  T(C): 37.3 (13 Mar 2021 14:09), Max: 38.6 (12 Mar 2021 23:24)  T(F): 99.1 (13 Mar 2021 14:09), Max: 101.5 (12 Mar 2021 23:24)  HR: 91 (13 Mar 2021 14:09) (88 - 91)  BP: 120/77 (13 Mar 2021 14:09) (118/70 - 128/66)  BP(mean): --  RR: 18 (13 Mar 2021 14:09) (17 - 18)  SpO2: 91% (13 Mar 2021 14:09) (91% - 95%)    EXAM:  GEN: middle aged male, alert, in no acute distress  CVS: rrr, normal s1/s2  RESP: on 2L O2, bilateral rales (right > left)  ABD: soft, nontender, nondistended, normoactive bowel sounds  EXT: no LE edema  NEURO: aaox3, no focal deficits    LABS:                        13.9   10.82 )-----------( 206      ( 13 Mar 2021 06:18 )             42.3     03-13    137  |  101  |  14  ----------------------------<  216<H>  3.9   |  23  |  0.93    Ca    9.0      13 Mar 2021 06:18  Phos  3.1     03-13  Mg     2.0     03-13    TPro  7.2  /  Alb  2.6<L>  /  TBili  0.3  /  DBili  0.1  /  AST  60<H>  /  ALT  63<H>  /  AlkPhos  50  03-13      IMAGING: reviewed

## 2021-03-14 LAB
ALBUMIN SERPL ELPH-MCNC: 2.4 G/DL — LOW (ref 3.5–5)
ALP SERPL-CCNC: 52 U/L — SIGNIFICANT CHANGE UP (ref 40–120)
ALT FLD-CCNC: 59 U/L DA — SIGNIFICANT CHANGE UP (ref 10–60)
ANION GAP SERPL CALC-SCNC: 16 MMOL/L — SIGNIFICANT CHANGE UP (ref 5–17)
APTT BLD: 33.4 SEC — SIGNIFICANT CHANGE UP (ref 27.5–35.5)
AST SERPL-CCNC: 48 U/L — HIGH (ref 10–40)
BASOPHILS # BLD AUTO: 0.02 K/UL — SIGNIFICANT CHANGE UP (ref 0–0.2)
BASOPHILS NFR BLD AUTO: 0.2 % — SIGNIFICANT CHANGE UP (ref 0–2)
BILIRUB SERPL-MCNC: 0.4 MG/DL — SIGNIFICANT CHANGE UP (ref 0.2–1.2)
BUN SERPL-MCNC: 16 MG/DL — SIGNIFICANT CHANGE UP (ref 7–18)
CALCIUM SERPL-MCNC: 8.7 MG/DL — SIGNIFICANT CHANGE UP (ref 8.4–10.5)
CHLORIDE SERPL-SCNC: 98 MMOL/L — SIGNIFICANT CHANGE UP (ref 96–108)
CO2 SERPL-SCNC: 21 MMOL/L — LOW (ref 22–31)
CREAT SERPL-MCNC: 0.95 MG/DL — SIGNIFICANT CHANGE UP (ref 0.5–1.3)
CRP SERPL-MCNC: 118 MG/L — HIGH
D DIMER BLD IA.RAPID-MCNC: 197 NG/ML DDU — SIGNIFICANT CHANGE UP
EOSINOPHIL # BLD AUTO: 0 K/UL — SIGNIFICANT CHANGE UP (ref 0–0.5)
EOSINOPHIL NFR BLD AUTO: 0 % — SIGNIFICANT CHANGE UP (ref 0–6)
ERYTHROCYTE [SEDIMENTATION RATE] IN BLOOD: 44 MM/HR — HIGH (ref 0–15)
GLUCOSE BLDC GLUCOMTR-MCNC: 271 MG/DL — HIGH (ref 70–99)
GLUCOSE BLDC GLUCOMTR-MCNC: 286 MG/DL — HIGH (ref 70–99)
GLUCOSE BLDC GLUCOMTR-MCNC: 288 MG/DL — HIGH (ref 70–99)
GLUCOSE BLDC GLUCOMTR-MCNC: 332 MG/DL — HIGH (ref 70–99)
GLUCOSE SERPL-MCNC: 229 MG/DL — HIGH (ref 70–99)
HCT VFR BLD CALC: 41.7 % — SIGNIFICANT CHANGE UP (ref 39–50)
HGB BLD-MCNC: 13.7 G/DL — SIGNIFICANT CHANGE UP (ref 13–17)
IMM GRANULOCYTES NFR BLD AUTO: 1.1 % — SIGNIFICANT CHANGE UP (ref 0–1.5)
INR BLD: 1.13 RATIO — SIGNIFICANT CHANGE UP (ref 0.88–1.16)
LDH SERPL L TO P-CCNC: 483 U/L — HIGH (ref 120–225)
LYMPHOCYTES # BLD AUTO: 1.66 K/UL — SIGNIFICANT CHANGE UP (ref 1–3.3)
LYMPHOCYTES # BLD AUTO: 13.8 % — SIGNIFICANT CHANGE UP (ref 13–44)
MAGNESIUM SERPL-MCNC: 2.1 MG/DL — SIGNIFICANT CHANGE UP (ref 1.6–2.6)
MCHC RBC-ENTMCNC: 28.6 PG — SIGNIFICANT CHANGE UP (ref 27–34)
MCHC RBC-ENTMCNC: 32.9 GM/DL — SIGNIFICANT CHANGE UP (ref 32–36)
MCV RBC AUTO: 87.1 FL — SIGNIFICANT CHANGE UP (ref 80–100)
MONOCYTES # BLD AUTO: 0.68 K/UL — SIGNIFICANT CHANGE UP (ref 0–0.9)
MONOCYTES NFR BLD AUTO: 5.6 % — SIGNIFICANT CHANGE UP (ref 2–14)
NEUTROPHILS # BLD AUTO: 9.57 K/UL — HIGH (ref 1.8–7.4)
NEUTROPHILS NFR BLD AUTO: 79.3 % — HIGH (ref 43–77)
NRBC # BLD: 0 /100 WBCS — SIGNIFICANT CHANGE UP (ref 0–0)
PHOSPHATE SERPL-MCNC: 3.6 MG/DL — SIGNIFICANT CHANGE UP (ref 2.5–4.5)
PLATELET # BLD AUTO: 234 K/UL — SIGNIFICANT CHANGE UP (ref 150–400)
POTASSIUM SERPL-MCNC: 3.9 MMOL/L — SIGNIFICANT CHANGE UP (ref 3.5–5.3)
POTASSIUM SERPL-SCNC: 3.9 MMOL/L — SIGNIFICANT CHANGE UP (ref 3.5–5.3)
PROCALCITONIN SERPL-MCNC: 0.22 NG/ML — HIGH (ref 0.02–0.1)
PROT SERPL-MCNC: 7 G/DL — SIGNIFICANT CHANGE UP (ref 6–8.3)
PROTHROM AB SERPL-ACNC: 13.4 SEC — SIGNIFICANT CHANGE UP (ref 10.6–13.6)
RBC # BLD: 4.79 M/UL — SIGNIFICANT CHANGE UP (ref 4.2–5.8)
RBC # FLD: 13.3 % — SIGNIFICANT CHANGE UP (ref 10.3–14.5)
SODIUM SERPL-SCNC: 135 MMOL/L — SIGNIFICANT CHANGE UP (ref 135–145)
WBC # BLD: 12.06 K/UL — HIGH (ref 3.8–10.5)
WBC # FLD AUTO: 12.06 K/UL — HIGH (ref 3.8–10.5)

## 2021-03-14 PROCEDURE — 99233 SBSQ HOSP IP/OBS HIGH 50: CPT | Mod: GC

## 2021-03-14 PROCEDURE — 71045 X-RAY EXAM CHEST 1 VIEW: CPT | Mod: 26

## 2021-03-14 RX ORDER — INSULIN GLARGINE 100 [IU]/ML
30 INJECTION, SOLUTION SUBCUTANEOUS EVERY MORNING
Refills: 0 | Status: DISCONTINUED | OUTPATIENT
Start: 2021-03-15 | End: 2021-03-15

## 2021-03-14 RX ORDER — AZITHROMYCIN 500 MG/1
500 TABLET, FILM COATED ORAL ONCE
Refills: 0 | Status: COMPLETED | OUTPATIENT
Start: 2021-03-14 | End: 2021-03-14

## 2021-03-14 RX ORDER — INSULIN GLARGINE 100 [IU]/ML
30 INJECTION, SOLUTION SUBCUTANEOUS ONCE
Refills: 0 | Status: COMPLETED | OUTPATIENT
Start: 2021-03-14 | End: 2021-03-14

## 2021-03-14 RX ORDER — INSULIN LISPRO 100/ML
3 VIAL (ML) SUBCUTANEOUS
Refills: 0 | Status: DISCONTINUED | OUTPATIENT
Start: 2021-03-14 | End: 2021-03-14

## 2021-03-14 RX ORDER — INSULIN GLARGINE 100 [IU]/ML
8 INJECTION, SOLUTION SUBCUTANEOUS AT BEDTIME
Refills: 0 | Status: DISCONTINUED | OUTPATIENT
Start: 2021-03-14 | End: 2021-03-14

## 2021-03-14 RX ORDER — AZITHROMYCIN 500 MG/1
500 TABLET, FILM COATED ORAL EVERY 24 HOURS
Refills: 0 | Status: DISCONTINUED | OUTPATIENT
Start: 2021-03-15 | End: 2021-03-15

## 2021-03-14 RX ORDER — INSULIN LISPRO 100/ML
2 VIAL (ML) SUBCUTANEOUS
Refills: 0 | Status: DISCONTINUED | OUTPATIENT
Start: 2021-03-14 | End: 2021-03-14

## 2021-03-14 RX ORDER — INSULIN GLARGINE 100 [IU]/ML
5 INJECTION, SOLUTION SUBCUTANEOUS AT BEDTIME
Refills: 0 | Status: DISCONTINUED | OUTPATIENT
Start: 2021-03-14 | End: 2021-03-14

## 2021-03-14 RX ORDER — AZITHROMYCIN 500 MG/1
TABLET, FILM COATED ORAL
Refills: 0 | Status: DISCONTINUED | OUTPATIENT
Start: 2021-03-14 | End: 2021-03-15

## 2021-03-14 RX ORDER — INSULIN LISPRO 100/ML
8 VIAL (ML) SUBCUTANEOUS
Refills: 0 | Status: DISCONTINUED | OUTPATIENT
Start: 2021-03-14 | End: 2021-03-15

## 2021-03-14 RX ADMIN — Medication 6 MILLIGRAM(S): at 05:13

## 2021-03-14 RX ADMIN — Medication 3: at 09:11

## 2021-03-14 RX ADMIN — REMDESIVIR 500 MILLIGRAM(S): 5 INJECTION INTRAVENOUS at 13:28

## 2021-03-14 RX ADMIN — ENOXAPARIN SODIUM 40 MILLIGRAM(S): 100 INJECTION SUBCUTANEOUS at 19:32

## 2021-03-14 RX ADMIN — INSULIN GLARGINE 30 UNIT(S): 100 INJECTION, SOLUTION SUBCUTANEOUS at 13:30

## 2021-03-14 RX ADMIN — Medication 8 UNIT(S): at 19:33

## 2021-03-14 RX ADMIN — AZITHROMYCIN 255 MILLIGRAM(S): 500 TABLET, FILM COATED ORAL at 09:44

## 2021-03-14 RX ADMIN — Medication 3 UNIT(S): at 11:52

## 2021-03-14 RX ADMIN — Medication 650 MILLIGRAM(S): at 05:23

## 2021-03-14 RX ADMIN — PANTOPRAZOLE SODIUM 40 MILLIGRAM(S): 20 TABLET, DELAYED RELEASE ORAL at 05:15

## 2021-03-14 RX ADMIN — Medication 650 MILLIGRAM(S): at 06:31

## 2021-03-14 RX ADMIN — Medication 4: at 11:51

## 2021-03-14 RX ADMIN — Medication 3: at 17:09

## 2021-03-14 RX ADMIN — ENOXAPARIN SODIUM 40 MILLIGRAM(S): 100 INJECTION SUBCUTANEOUS at 05:14

## 2021-03-14 NOTE — CONSULT NOTE ADULT - ASSESSMENT
39 yo male with h/o DM type 2, admitted with fever, cough    Endocrinology consulted for glycemic management    DM type 2  uncontrolled  complicated by hyperglycemia, high dose steroid use, acute COVID 19+  HbA1C: 15%  home regimen:  metformin 500mg bid    recommendations:  increase basal/bolus:  - insulin lantus 30 units x 1 now then daily in am   - insulin lispro 8 units pre meals  - continue low dose sliding scale  reassess based on requirements  goal inpatient glucose range 140-180mg/dl    tentative discharge regimen  needs insulin teaching prior to discharge    acute hypoxic respiratory failure  acute COVID 19  on supplemental O2, dexamethasone, remdesivir    morbid obesity  acute COVID 19 on dexamethasone  contributing to insulin resistance  would benefit from GLP1 agonist as outpatient  glycemic management    Discussed with primary team  Please call Endocrine- [phone]- Dr Tamika Collier as needed

## 2021-03-14 NOTE — PROGRESS NOTE ADULT - SUBJECTIVE AND OBJECTIVE BOX
PGY-1 Progress Note discussed with attending    PAGER #: [[phone]] TILL 5:00 PM  PLEASE CONTACT ON CALL TEAM:  - On Call Team (Please refer to Aria) FROM 5:00 PM - 8:30PM  - Nightfloat Team FROM 8:30 -7:30 AM    INTERVAL HPI  - 40 yr male with a PMHX of DM and no PSHx presents to the ED with complaints of fever and cough for x1 week. Patient states he saw his PMD today, where he was noted hypoxic to 86% on room air and had a negative rapid COVID test in the office. Pt denies any chest pain, N/V/D, abdominal pain, urinary symptoms or any other acute complaints. Admitted for acute hypoxic respiratory failure 2/2 COVID. Patient started on oxygen supplementation. CXR showed bilateral infiltrate, consistent with COVID. D-dimer and troponin are wnl.    OVERNIGHT EVENTS:   -     REVIEW OF SYSTEMS:  CONSTITUTIONAL: No fever, weight loss, or fatigue  RESPIRATORY: No cough, wheezing, chills or hemoptysis; No shortness of breath  CARDIOVASCULAR: No chest pain, palpitations, dizziness, or leg swelling  GASTROINTESTINAL: No abdominal pain. No nausea, vomiting, or hematemesis; No diarrhea or constipation. No melena or hematochezia.  GENITOURINARY: No dysuria or hematuria, urinary frequency  NEUROLOGICAL: No headaches, memory loss, loss of strength, numbness, or tremors  SKIN: No itching, burning, rashes, or lesions     MEDICATIONS  (STANDING):  azithromycin  IVPB      dexAMETHasone  Injectable 6 milliGRAM(s) IV Push daily  enoxaparin Injectable 40 milliGRAM(s) SubCutaneous every 12 hours  influenza   Vaccine 0.5 milliLiter(s) IntraMuscular once  insulin glargine Injectable (LANTUS) 8 Unit(s) SubCutaneous at bedtime  insulin lispro (ADMELOG) corrective regimen sliding scale   SubCutaneous three times a day before meals  insulin lispro Injectable (ADMELOG) 3 Unit(s) SubCutaneous three times a day before meals  pantoprazole    Tablet 40 milliGRAM(s) Oral before breakfast  remdesivir  IVPB   IV Intermittent   remdesivir  IVPB 100 milliGRAM(s) IV Intermittent every 24 hours    MEDICATIONS  (PRN):  acetaminophen   Tablet .. 650 milliGRAM(s) Oral every 6 hours PRN Temp greater or equal to 38.5C (101.3F)      Vital Signs Last 24 Hrs  T(C): 37.1 (14 Mar 2021 09:36), Max: 38.3 (14 Mar 2021 05:22)  T(F): 98.7 (14 Mar 2021 09:36), Max: 101 (14 Mar 2021 05:22)  HR: 94 (14 Mar 2021 05:22) (84 - 94)  BP: 122/73 (14 Mar 2021 05:22) (120/77 - 141/80)  BP(mean): --  RR: 19 (14 Mar 2021 05:22) (18 - 19)  SpO2: 92% (14 Mar 2021 05:22) (91% - 94%)    PHYSICAL EXAMINATION:  GENERAL: NAD, AAOx  HEAD: AT/NC  EYES: conjunctiva and sclera clear  NECK: supple, No JVD noted, Normal thyroid  CHEST/LUNG: CTABL; no rales, rhonchi, wheezing, or rubs  HEART: regular rate and rhythm; no murmurs, rubs, or gallops  ABDOMEN: soft, nontender, nondistended; Bowel sounds present  EXTREMITIES:  2+ Peripheral Pulses, No clubbing, cyanosis, or edema  SKIN: warm dry                          13.7   12.06 )-----------( 234      ( 14 Mar 2021 06:57 )             41.7     03-14    135  |  98  |  16  ----------------------------<  229<H>  3.9   |  21<L>  |  0.95    Ca    8.7      14 Mar 2021 06:57  Phos  3.6     03-14  Mg     2.1     03-14    TPro  7.0  /  Alb  2.4<L>  /  TBili  0.4  /  DBili  x   /  AST  48<H>  /  ALT  59  /  AlkPhos  52  03-14    LIVER FUNCTIONS - ( 14 Mar 2021 06:57 )  Alb: 2.4 g/dL / Pro: 7.0 g/dL / ALK PHOS: 52 U/L / ALT: 59 U/L DA / AST: 48 U/L / GGT: x               PT/INR - ( 14 Mar 2021 06:57 )   PT: 13.4 sec;   INR: 1.13 ratio         PTT - ( 14 Mar 2021 06:57 )  PTT:33.4 sec  COVID-19 PCR: Detected (11 Mar 2021 17:47)      CAPILLARY BLOOD GLUCOSE      POCT Blood Glucose.: 271 mg/dL (14 Mar 2021 08:31)  POCT Blood Glucose.: 238 mg/dL (13 Mar 2021 21:09)  POCT Blood Glucose.: 274 mg/dL (13 Mar 2021 16:58)      RADIOLOGY & ADDITIONAL TESTS:                   PGY-1 Progress Note discussed with attending    PAGER #: [[phone]] TILL 5:00 PM  PLEASE CONTACT ON CALL TEAM:  - On Call Team (Please refer to Aria) FROM 5:00 PM - 8:30PM  - Nightfloat Team FROM 8:30 -7:30 AM    INTERVAL HPI  - 40 yr male with a PMHX of DM and no PSHx presents to the ED with complaints of fever and cough for x1 week. Patient states he saw his PMD today, where he was noted hypoxic to 86% on room air and had a negative rapid COVID test in the office. Pt denies any chest pain, N/V/D, abdominal pain, urinary symptoms or any other acute complaints. Admitted for acute hypoxic respiratory failure 2/2 COVID. Patient started on oxygen supplementation. CXR showed bilateral infiltrate, consistent with COVID. D-dimer and troponin are wnl. Patient started on Remdesivir and decadron.    OVERNIGHT EVENTS:   - spiking fever overnight. Given Tylenol Patient reports excessive mucous production, but improving respiratory function.     REVIEW OF SYSTEMS:  CONSTITUTIONAL: No fever, weight loss, or fatigue  RESPIRATORY: increased mucus production; No cough, wheezing, chills or hemoptysis; No shortness of breath  CARDIOVASCULAR: No chest pain, palpitations, dizziness, or leg swelling  GASTROINTESTINAL: No abdominal pain. No nausea, vomiting, or hematemesis; No diarrhea or constipation. No melena or hematochezia.  GENITOURINARY: No dysuria or hematuria, urinary frequency  NEUROLOGICAL: No headaches, memory loss, loss of strength, numbness, or tremors  SKIN: No itching, burning, rashes, or lesions     MEDICATIONS  (STANDING):  azithromycin  IVPB      dexAMETHasone  Injectable 6 milliGRAM(s) IV Push daily  enoxaparin Injectable 40 milliGRAM(s) SubCutaneous every 12 hours  influenza   Vaccine 0.5 milliLiter(s) IntraMuscular once  insulin glargine Injectable (LANTUS) 8 Unit(s) SubCutaneous at bedtime  insulin lispro (ADMELOG) corrective regimen sliding scale   SubCutaneous three times a day before meals  insulin lispro Injectable (ADMELOG) 3 Unit(s) SubCutaneous three times a day before meals  pantoprazole    Tablet 40 milliGRAM(s) Oral before breakfast  remdesivir  IVPB   IV Intermittent   remdesivir  IVPB 100 milliGRAM(s) IV Intermittent every 24 hours    MEDICATIONS  (PRN):  acetaminophen   Tablet .. 650 milliGRAM(s) Oral every 6 hours PRN Temp greater or equal to 38.5C (101.3F)      Vital Signs Last 24 Hrs  T(C): 37.1 (14 Mar 2021 09:36), Max: 38.3 (14 Mar 2021 05:22)  T(F): 98.7 (14 Mar 2021 09:36), Max: 101 (14 Mar 2021 05:22)  HR: 94 (14 Mar 2021 05:22) (84 - 94)  BP: 122/73 (14 Mar 2021 05:22) (120/77 - 141/80)  BP(mean): --  RR: 19 (14 Mar 2021 05:22) (18 - 19)  SpO2: 92% (14 Mar 2021 05:22) (91% - 94%)    PHYSICAL EXAMINATION:  GENERAL: NAD, AAOx3, obese  HEAD: AT/NC  EYES: conjunctiva and sclera clear  NECK: supple, No JVD noted, Normal thyroid  CHEST/LUNG: CTABL; no rales, rhonchi, wheezing, or rubs  HEART: regular rate and rhythm; no murmurs, rubs, or gallops  ABDOMEN: soft, nontender, nondistended; Bowel sounds present  EXTREMITIES:  2+ Peripheral Pulses, No clubbing, cyanosis, or edema  SKIN: warm dry                          13.7   12.06 )-----------( 234      ( 14 Mar 2021 06:57 )             41.7     03-14    135  |  98  |  16  ----------------------------<  229<H>  3.9   |  21<L>  |  0.95    Ca    8.7      14 Mar 2021 06:57  Phos  3.6     03-14  Mg     2.1     03-14    TPro  7.0  /  Alb  2.4<L>  /  TBili  0.4  /  DBili  x   /  AST  48<H>  /  ALT  59  /  AlkPhos  52  03-14    LIVER FUNCTIONS - ( 14 Mar 2021 06:57 )  Alb: 2.4 g/dL / Pro: 7.0 g/dL / ALK PHOS: 52 U/L / ALT: 59 U/L DA / AST: 48 U/L / GGT: x               PT/INR - ( 14 Mar 2021 06:57 )   PT: 13.4 sec;   INR: 1.13 ratio         PTT - ( 14 Mar 2021 06:57 )  PTT:33.4 sec  COVID-19 PCR: Detected (11 Mar 2021 17:47)      CAPILLARY BLOOD GLUCOSE      POCT Blood Glucose.: 271 mg/dL (14 Mar 2021 08:31)  POCT Blood Glucose.: 238 mg/dL (13 Mar 2021 21:09)  POCT Blood Glucose.: 274 mg/dL (13 Mar 2021 16:58)      RADIOLOGY & ADDITIONAL TESTS:

## 2021-03-14 NOTE — PROGRESS NOTE ADULT - ASSESSMENT
PGY-1 Progress Note discussed with attending    PAGER #: [[phone]] TILL 5:00 PM  PLEASE CONTACT ON CALL TEAM:  - On Call Team (Please refer to [facility]) FROM 5:00 PM - 8:30PM  - Nightfloat Team FROM 8:30 -7:30 AM    INTERVAL HPI  -     OVERNIGHT EVENTS:   -     REVIEW OF SYSTEMS:  CONSTITUTIONAL: No fever, weight loss, or fatigue  RESPIRATORY: No cough, wheezing, chills or hemoptysis; No shortness of breath  CARDIOVASCULAR: No chest pain, palpitations, dizziness, or leg swelling  GASTROINTESTINAL: No abdominal pain. No nausea, vomiting, or hematemesis; No diarrhea or constipation. No melena or hematochezia.  GENITOURINARY: No dysuria or hematuria, urinary frequency  NEUROLOGICAL: No headaches, memory loss, loss of strength, numbness, or tremors  SKIN: No itching, burning, rashes, or lesions     MEDICATIONS  (STANDING):  azithromycin  IVPB      dexAMETHasone  Injectable 6 milliGRAM(s) IV Push daily  enoxaparin Injectable 40 milliGRAM(s) SubCutaneous every 12 hours  influenza   Vaccine 0.5 milliLiter(s) IntraMuscular once  insulin glargine Injectable (LANTUS) 8 Unit(s) SubCutaneous at bedtime  insulin lispro (ADMELOG) corrective regimen sliding scale   SubCutaneous three times a day before meals  insulin lispro Injectable (ADMELOG) 3 Unit(s) SubCutaneous three times a day before meals  pantoprazole    Tablet 40 milliGRAM(s) Oral before breakfast  remdesivir  IVPB   IV Intermittent   remdesivir  IVPB 100 milliGRAM(s) IV Intermittent every 24 hours    MEDICATIONS  (PRN):  acetaminophen   Tablet .. 650 milliGRAM(s) Oral every 6 hours PRN Temp greater or equal to 38.5C (101.3F)      Vital Signs Last 24 Hrs  T(C): 37.1 (14 Mar 2021 09:36), Max: 38.3 (14 Mar 2021 05:22)  T(F): 98.7 (14 Mar 2021 09:36), Max: 101 (14 Mar 2021 05:22)  HR: 94 (14 Mar 2021 05:22) (84 - 94)  BP: 122/73 (14 Mar 2021 05:22) (120/77 - 141/80)  BP(mean): --  RR: 19 (14 Mar 2021 05:22) (18 - 19)  SpO2: 92% (14 Mar 2021 05:22) (91% - 94%)    PHYSICAL EXAMINATION:  GENERAL: NAD, AAOx3  HEAD: AT/NC  EYES: conjunctiva and sclera clear  NECK: supple, No JVD noted, Normal thyroid  CHEST/LUNG: CTABL; no rales, rhonchi, wheezing, or rubs  HEART: regular rate and rhythm; no murmurs, rubs, or gallops  ABDOMEN: soft, nontender, nondistended; Bowel sounds present  EXTREMITIES:  2+ Peripheral Pulses, No clubbing, cyanosis, or edema  SKIN: warm dry                          13.7   12.06 )-----------( 234      ( 14 Mar 2021 06:57 )             41.7     03-14    135  |  98  |  16  ----------------------------<  229<H>  3.9   |  21<L>  |  0.95    Ca    8.7      14 Mar 2021 06:57  Phos  3.6     03-14  Mg     2.1     03-14    TPro  7.0  /  Alb  2.4<L>  /  TBili  0.4  /  DBili  x   /  AST  48<H>  /  ALT  59  /  AlkPhos  52  03-14    LIVER FUNCTIONS - ( 14 Mar 2021 06:57 )  Alb: 2.4 g/dL / Pro: 7.0 g/dL / ALK PHOS: 52 U/L / ALT: 59 U/L DA / AST: 48 U/L / GGT: x               PT/INR - ( 14 Mar 2021 06:57 )   PT: 13.4 sec;   INR: 1.13 ratio         PTT - ( 14 Mar 2021 06:57 )  PTT:33.4 sec  COVID-19 PCR: Detected (11 Mar 2021 17:47)      CAPILLARY BLOOD GLUCOSE      POCT Blood Glucose.: 332 mg/dL (14 Mar 2021 11:37)  POCT Blood Glucose.: 271 mg/dL (14 Mar 2021 08:31)  POCT Blood Glucose.: 238 mg/dL (13 Mar 2021 21:09)  POCT Blood Glucose.: 274 mg/dL (13 Mar 2021 16:58)      RADIOLOGY & ADDITIONAL TESTS:                   40 yr male with a PMHX of DM and no PSHx presents to the ED with complaints of fever and cough for x1 week. Patient states he saw his PMD today, where he was noted hypoxic to 86% on room air and had a negative rapid COVID test in the office. Pt denies any chest pain, N/V/D, abdominal pain, urinary symptoms or any other acute complaints. Admitted for acute hypoxic respiratory failure 2/2 COVID pneumonia

## 2021-03-15 LAB
ALBUMIN SERPL ELPH-MCNC: 2.6 G/DL — LOW (ref 3.5–5)
ALP SERPL-CCNC: 55 U/L — SIGNIFICANT CHANGE UP (ref 40–120)
ALT FLD-CCNC: 60 U/L DA — SIGNIFICANT CHANGE UP (ref 10–60)
ANION GAP SERPL CALC-SCNC: 13 MMOL/L — SIGNIFICANT CHANGE UP (ref 5–17)
APTT BLD: 33.4 SEC — SIGNIFICANT CHANGE UP (ref 27.5–35.5)
AST SERPL-CCNC: 42 U/L — HIGH (ref 10–40)
BASOPHILS # BLD AUTO: SIGNIFICANT CHANGE UP K/UL (ref 0–0.2)
BASOPHILS NFR BLD AUTO: SIGNIFICANT CHANGE UP % (ref 0–2)
BILIRUB SERPL-MCNC: 0.3 MG/DL — SIGNIFICANT CHANGE UP (ref 0.2–1.2)
BUN SERPL-MCNC: 19 MG/DL — HIGH (ref 7–18)
CALCIUM SERPL-MCNC: 9.6 MG/DL — SIGNIFICANT CHANGE UP (ref 8.4–10.5)
CHLORIDE SERPL-SCNC: 98 MMOL/L — SIGNIFICANT CHANGE UP (ref 96–108)
CO2 SERPL-SCNC: 25 MMOL/L — SIGNIFICANT CHANGE UP (ref 22–31)
CREAT SERPL-MCNC: 0.97 MG/DL — SIGNIFICANT CHANGE UP (ref 0.5–1.3)
CRP SERPL-MCNC: 88 MG/L — HIGH
D DIMER BLD IA.RAPID-MCNC: 224 NG/ML DDU — SIGNIFICANT CHANGE UP
EOSINOPHIL # BLD AUTO: SIGNIFICANT CHANGE UP K/UL (ref 0–0.5)
EOSINOPHIL NFR BLD AUTO: SIGNIFICANT CHANGE UP % (ref 0–6)
ERYTHROCYTE [SEDIMENTATION RATE] IN BLOOD: 53 MM/HR — HIGH (ref 0–15)
GLUCOSE BLDC GLUCOMTR-MCNC: 206 MG/DL — HIGH (ref 70–99)
GLUCOSE BLDC GLUCOMTR-MCNC: 222 MG/DL — HIGH (ref 70–99)
GLUCOSE BLDC GLUCOMTR-MCNC: 297 MG/DL — HIGH (ref 70–99)
GLUCOSE BLDC GLUCOMTR-MCNC: 302 MG/DL — HIGH (ref 70–99)
GLUCOSE BLDC GLUCOMTR-MCNC: 308 MG/DL — HIGH (ref 70–99)
GLUCOSE SERPL-MCNC: 243 MG/DL — HIGH (ref 70–99)
HCT VFR BLD CALC: 42.8 % — SIGNIFICANT CHANGE UP (ref 39–50)
HGB BLD-MCNC: 14.4 G/DL — SIGNIFICANT CHANGE UP (ref 13–17)
IMM GRANULOCYTES NFR BLD AUTO: SIGNIFICANT CHANGE UP % (ref 0–1.5)
LDH SERPL L TO P-CCNC: 469 U/L — HIGH (ref 120–225)
LYMPHOCYTES # BLD AUTO: SIGNIFICANT CHANGE UP % (ref 13–44)
LYMPHOCYTES # BLD AUTO: SIGNIFICANT CHANGE UP K/UL (ref 1–3.3)
MAGNESIUM SERPL-MCNC: 2.2 MG/DL — SIGNIFICANT CHANGE UP (ref 1.6–2.6)
MCHC RBC-ENTMCNC: 29.6 PG — SIGNIFICANT CHANGE UP (ref 27–34)
MCHC RBC-ENTMCNC: 33.6 GM/DL — SIGNIFICANT CHANGE UP (ref 32–36)
MCV RBC AUTO: 87.9 FL — SIGNIFICANT CHANGE UP (ref 80–100)
MONOCYTES # BLD AUTO: SIGNIFICANT CHANGE UP K/UL (ref 0–0.9)
MONOCYTES NFR BLD AUTO: SIGNIFICANT CHANGE UP % (ref 2–14)
NEUTROPHILS # BLD AUTO: SIGNIFICANT CHANGE UP K/UL (ref 1.8–7.4)
NEUTROPHILS NFR BLD AUTO: SIGNIFICANT CHANGE UP % (ref 43–77)
NRBC # BLD: 0 /100 WBCS — SIGNIFICANT CHANGE UP (ref 0–0)
PHOSPHATE SERPL-MCNC: 3.6 MG/DL — SIGNIFICANT CHANGE UP (ref 2.5–4.5)
PLATELET # BLD AUTO: 290 K/UL — SIGNIFICANT CHANGE UP (ref 150–400)
POTASSIUM SERPL-MCNC: 3.8 MMOL/L — SIGNIFICANT CHANGE UP (ref 3.5–5.3)
POTASSIUM SERPL-SCNC: 3.8 MMOL/L — SIGNIFICANT CHANGE UP (ref 3.5–5.3)
PROT SERPL-MCNC: 7.2 G/DL — SIGNIFICANT CHANGE UP (ref 6–8.3)
RBC # BLD: 4.87 M/UL — SIGNIFICANT CHANGE UP (ref 4.2–5.8)
RBC # FLD: 13.5 % — SIGNIFICANT CHANGE UP (ref 10.3–14.5)
SODIUM SERPL-SCNC: 136 MMOL/L — SIGNIFICANT CHANGE UP (ref 135–145)
WBC # BLD: 9.35 K/UL — SIGNIFICANT CHANGE UP (ref 3.8–10.5)
WBC # FLD AUTO: 9.35 K/UL — SIGNIFICANT CHANGE UP (ref 3.8–10.5)

## 2021-03-15 PROCEDURE — 99233 SBSQ HOSP IP/OBS HIGH 50: CPT | Mod: GC

## 2021-03-15 RX ORDER — INSULIN LISPRO 100/ML
10 VIAL (ML) SUBCUTANEOUS
Refills: 0 | Status: DISCONTINUED | OUTPATIENT
Start: 2021-03-15 | End: 2021-03-15

## 2021-03-15 RX ORDER — INSULIN GLARGINE 100 [IU]/ML
34 INJECTION, SOLUTION SUBCUTANEOUS ONCE
Refills: 0 | Status: COMPLETED | OUTPATIENT
Start: 2021-03-15 | End: 2021-03-15

## 2021-03-15 RX ORDER — INSULIN LISPRO 100/ML
12 VIAL (ML) SUBCUTANEOUS
Refills: 0 | Status: DISCONTINUED | OUTPATIENT
Start: 2021-03-15 | End: 2021-03-16

## 2021-03-15 RX ORDER — INSULIN GLARGINE 100 [IU]/ML
34 INJECTION, SOLUTION SUBCUTANEOUS EVERY MORNING
Refills: 0 | Status: DISCONTINUED | OUTPATIENT
Start: 2021-03-15 | End: 2021-03-15

## 2021-03-15 RX ORDER — INSULIN GLARGINE 100 [IU]/ML
40 INJECTION, SOLUTION SUBCUTANEOUS EVERY MORNING
Refills: 0 | Status: DISCONTINUED | OUTPATIENT
Start: 2021-03-16 | End: 2021-03-16

## 2021-03-15 RX ADMIN — Medication 4: at 12:36

## 2021-03-15 RX ADMIN — Medication 12 UNIT(S): at 12:37

## 2021-03-15 RX ADMIN — ENOXAPARIN SODIUM 40 MILLIGRAM(S): 100 INJECTION SUBCUTANEOUS at 18:19

## 2021-03-15 RX ADMIN — REMDESIVIR 500 MILLIGRAM(S): 5 INJECTION INTRAVENOUS at 11:46

## 2021-03-15 RX ADMIN — ENOXAPARIN SODIUM 40 MILLIGRAM(S): 100 INJECTION SUBCUTANEOUS at 05:23

## 2021-03-15 RX ADMIN — INSULIN GLARGINE 34 UNIT(S): 100 INJECTION, SOLUTION SUBCUTANEOUS at 15:34

## 2021-03-15 RX ADMIN — PANTOPRAZOLE SODIUM 40 MILLIGRAM(S): 20 TABLET, DELAYED RELEASE ORAL at 05:24

## 2021-03-15 RX ADMIN — Medication 12 UNIT(S): at 17:19

## 2021-03-15 RX ADMIN — Medication 6 MILLIGRAM(S): at 05:22

## 2021-03-15 RX ADMIN — Medication 3: at 17:18

## 2021-03-15 NOTE — PROGRESS NOTE ADULT - SUBJECTIVE AND OBJECTIVE BOX
Interval Events:    tolerating po intake  hyperglycemic  per staff- had outside food  on dexamethasone daily    Allergies    No Known Allergies    Intolerances      Endocrine/Metabolic Medications:  dexAMETHasone  Injectable 6 milliGRAM(s) IV Push daily  insulin glargine Injectable (LANTUS) 34 Unit(s) SubCutaneous once  insulin lispro (ADMELOG) corrective regimen sliding scale   SubCutaneous three times a day before meals  insulin lispro Injectable (ADMELOG) 12 Unit(s) SubCutaneous three times a day before meals      Vital Signs Last 24 Hrs  T(C): 36.7 (15 Mar 2021 14:29), Max: 37.2 (15 Mar 2021 05:20)  T(F): 98 (15 Mar 2021 14:29), Max: 98.9 (15 Mar 2021 05:20)  HR: 87 (15 Mar 2021 14:29) (80 - 87)  BP: 141/81 (15 Mar 2021 14:29) (127/79 - 142/79)  BP(mean): --  RR: 18 (15 Mar 2021 14:29) (17 - 18)  SpO2: 92% (15 Mar 2021 14:29) (90% - 92%)      PHYSICAL EXAM    Constitutional:    NC/AT:    HEENT:    Neck:  No JVD    Respiratory:  reduced breath sounds b/l bases    Cardiovascular:  RR without murmur    Gastrointestinal: Soft    Extremities: without cyanosis    Neurological:  non focal      LABS                        14.4   9.35  )-----------( 290      ( 15 Mar 2021 06:16 )             42.8                               136    |  98     |  19                  Calcium: 9.6   / iCa: x      (03-15 @ 06:16)    ----------------------------<  243       Magnesium: 2.2                              3.8     |  25     |  0.97             Phosphorous: 3.6      TPro  7.2    /  Alb  2.6    /  TBili  0.3    /  DBili  x      /  AST  42     /  ALT  60     /  AlkPhos  55     15 Mar 2021 06:16    CAPILLARY BLOOD GLUCOSE      POCT Blood Glucose.: 302 mg/dL (15 Mar 2021 12:31)  POCT Blood Glucose.: 308 mg/dL (15 Mar 2021 11:11)  POCT Blood Glucose.: 222 mg/dL (15 Mar 2021 07:58)  POCT Blood Glucose.: 288 mg/dL (14 Mar 2021 21:23)  POCT Blood Glucose.: 286 mg/dL (14 Mar 2021 17:03)        Assesment/plan        39 yo male with h/o DM type 2, admitted with fever, cough    Endocrinology consulted for glycemic management    DM type 2  uncontrolled  complicated by hyperglycemia, high dose steroid use, acute COVID 19+  HbA1C: 15%  home regimen:  metformin 500mg bid    recommendations:  increase basal/bolus:  - insulin lantus 34 units x 1 now then daily in am   - insulin lispro 12 units pre meals  - continue current sliding scale  reassess based on requirements  goal inpatient glucose range 140-180mg/dl    tentative discharge regimen  needs insulin teaching prior to discharge- discussed with patient  advised would need basal/bolus- 4 insulin injections a day  need for outpatient endo follow up  insulin pen use demonstrated    acute hypoxic respiratory failure  acute COVID 19  on supplemental O2, dexamethasone, remdesivir    morbid obesity  acute COVID 19 on dexamethasone  contributing to insulin resistance  would benefit from GLP1 agonist as outpatient  glycemic management    Discussed with patient and primary team  Please call Endocrine- [phone]- Dr Tamika Collier as needed

## 2021-03-15 NOTE — PROGRESS NOTE ADULT - SUBJECTIVE AND OBJECTIVE BOX
PGY-1 Progress Note discussed with attending    PAGER #: [[phone]] TILL 5:00 PM  PLEASE CONTACT ON CALL TEAM:  - On Call Team (Please refer to [facility]) FROM 5:00 PM - 8:30PM  - Nightfloat Team FROM 8:30 -7:30 AM    INTERVAL HPI  - 40 yr male with a PMHX of DM and no PSHx presents to the ED with complaints of fever and cough for x1 week. Patient states he saw his PMD today, where he was noted hypoxic to 86% on room air and had a negative rapid COVID test in the office. Pt denies any chest pain, N/V/D, abdominal pain, urinary symptoms or any other acute complaints. Admitted for acute hypoxic respiratory failure 2/2 COVID. Patient started on oxygen supplementation. CXR showed bilateral infiltrate, consistent with COVID. D-dimer and troponin are wnl. Patient started on Remdesivir and decadron.    OVERNIGHT EVENTS:   - No acute events overnight. Patient reports no complaints. Remains comfortable with oxygen supp.     REVIEW OF SYSTEMS:  CONSTITUTIONAL: No fever, weight loss, or fatigue  RESPIRATORY: No cough, wheezing, chills or hemoptysis; No shortness of breath  CARDIOVASCULAR: No chest pain, palpitations, dizziness, or leg swelling  GASTROINTESTINAL: No abdominal pain. No nausea, vomiting, or hematemesis; No diarrhea or constipation. No melena or hematochezia.  GENITOURINARY: No dysuria or hematuria, urinary frequency  NEUROLOGICAL: No headaches, memory loss, loss of strength, numbness, or tremors  SKIN: No itching, burning, rashes, or lesions     MEDICATIONS  (STANDING):  dexAMETHasone  Injectable 6 milliGRAM(s) IV Push daily  enoxaparin Injectable 40 milliGRAM(s) SubCutaneous every 12 hours  influenza   Vaccine 0.5 milliLiter(s) IntraMuscular once  insulin glargine Injectable (LANTUS) 34 Unit(s) SubCutaneous every morning  insulin lispro (ADMELOG) corrective regimen sliding scale   SubCutaneous three times a day before meals  insulin lispro Injectable (ADMELOG) 12 Unit(s) SubCutaneous three times a day before meals  pantoprazole    Tablet 40 milliGRAM(s) Oral before breakfast  remdesivir  IVPB   IV Intermittent   remdesivir  IVPB 100 milliGRAM(s) IV Intermittent every 24 hours    MEDICATIONS  (PRN):  acetaminophen   Tablet .. 650 milliGRAM(s) Oral every 6 hours PRN Temp greater or equal to 38.5C (101.3F)      Vital Signs Last 24 Hrs  T(C): 37.2 (15 Mar 2021 05:20), Max: 37.2 (15 Mar 2021 05:20)  T(F): 98.9 (15 Mar 2021 05:20), Max: 98.9 (15 Mar 2021 05:20)  HR: 80 (15 Mar 2021 05:20) (80 - 84)  BP: 127/79 (15 Mar 2021 05:20) (127/79 - 142/79)  BP(mean): --  RR: 18 (15 Mar 2021 05:20) (17 - 18)  SpO2: 91% (15 Mar 2021 05:20) (90% - 92%)    PHYSICAL EXAMINATION:  GENERAL: NAD, AAOx3  HEAD: AT/NC  EYES: conjunctiva and sclera clear  NECK: supple, No JVD noted, Normal thyroid  CHEST/LUNG: CTABL w/ 4L via NC; no rales, rhonchi, wheezing, or rubs  HEART: regular rate and rhythm; no murmurs, rubs, or gallops  ABDOMEN: soft, nontender, nondistended; Bowel sounds present  EXTREMITIES:  2+ Peripheral Pulses, No clubbing, cyanosis, or edema  SKIN: warm dry                          14.4   9.35  )-----------( 290      ( 15 Mar 2021 06:16 )             42.8     03-15    136  |  98  |  19<H>  ----------------------------<  243<H>  3.8   |  25  |  0.97    Ca    9.6      15 Mar 2021 06:16  Phos  3.6     03-15  Mg     2.2     03-15    TPro  7.2  /  Alb  2.6<L>  /  TBili  0.3  /  DBili  x   /  AST  42<H>  /  ALT  60  /  AlkPhos  55  03-15    LIVER FUNCTIONS - ( 15 Mar 2021 06:16 )  Alb: 2.6 g/dL / Pro: 7.2 g/dL / ALK PHOS: 55 U/L / ALT: 60 U/L DA / AST: 42 U/L / GGT: x               PT/INR - ( 14 Mar 2021 06:57 )   PT: 13.4 sec;   INR: 1.13 ratio         PTT - ( 15 Mar 2021 06:16 )  PTT:33.4 sec  COVID-19 PCR: Detected (11 Mar 2021 17:47)      CAPILLARY BLOOD GLUCOSE      POCT Blood Glucose.: 222 mg/dL (15 Mar 2021 07:58)  POCT Blood Glucose.: 288 mg/dL (14 Mar 2021 21:23)  POCT Blood Glucose.: 286 mg/dL (14 Mar 2021 17:03)  POCT Blood Glucose.: 332 mg/dL (14 Mar 2021 11:37)      RADIOLOGY & ADDITIONAL TESTS:

## 2021-03-16 VITALS
DIASTOLIC BLOOD PRESSURE: 79 MMHG | HEART RATE: 90 BPM | RESPIRATION RATE: 18 BRPM | OXYGEN SATURATION: 91 % | TEMPERATURE: 98 F | SYSTOLIC BLOOD PRESSURE: 124 MMHG

## 2021-03-16 LAB
ALBUMIN SERPL ELPH-MCNC: 2.4 G/DL — LOW (ref 3.5–5)
ALP SERPL-CCNC: 49 U/L — SIGNIFICANT CHANGE UP (ref 40–120)
ALT FLD-CCNC: 58 U/L DA — SIGNIFICANT CHANGE UP (ref 10–60)
ANION GAP SERPL CALC-SCNC: 10 MMOL/L — SIGNIFICANT CHANGE UP (ref 5–17)
ANISOCYTOSIS BLD QL: SLIGHT — SIGNIFICANT CHANGE UP
AST SERPL-CCNC: 35 U/L — SIGNIFICANT CHANGE UP (ref 10–40)
BASOPHILS # BLD AUTO: 0 K/UL — SIGNIFICANT CHANGE UP (ref 0–0.2)
BASOPHILS NFR BLD AUTO: 0 % — SIGNIFICANT CHANGE UP (ref 0–2)
BILIRUB SERPL-MCNC: 0.3 MG/DL — SIGNIFICANT CHANGE UP (ref 0.2–1.2)
BUN SERPL-MCNC: 20 MG/DL — HIGH (ref 7–18)
CALCIUM SERPL-MCNC: 9.4 MG/DL — SIGNIFICANT CHANGE UP (ref 8.4–10.5)
CHLORIDE SERPL-SCNC: 99 MMOL/L — SIGNIFICANT CHANGE UP (ref 96–108)
CO2 SERPL-SCNC: 26 MMOL/L — SIGNIFICANT CHANGE UP (ref 22–31)
CREAT SERPL-MCNC: 0.9 MG/DL — SIGNIFICANT CHANGE UP (ref 0.5–1.3)
EOSINOPHIL # BLD AUTO: 0 K/UL — SIGNIFICANT CHANGE UP (ref 0–0.5)
EOSINOPHIL NFR BLD AUTO: 0 % — SIGNIFICANT CHANGE UP (ref 0–6)
GLUCOSE BLDC GLUCOMTR-MCNC: 226 MG/DL — HIGH (ref 70–99)
GLUCOSE BLDC GLUCOMTR-MCNC: 296 MG/DL — HIGH (ref 70–99)
GLUCOSE SERPL-MCNC: 220 MG/DL — HIGH (ref 70–99)
HCT VFR BLD CALC: 41.9 % — SIGNIFICANT CHANGE UP (ref 39–50)
HGB BLD-MCNC: 14.2 G/DL — SIGNIFICANT CHANGE UP (ref 13–17)
HYPOCHROMIA BLD QL: SLIGHT — SIGNIFICANT CHANGE UP
INR BLD: 1.16 RATIO — SIGNIFICANT CHANGE UP (ref 0.88–1.16)
LYMPHOCYTES # BLD AUTO: 2.47 K/UL — SIGNIFICANT CHANGE UP (ref 1–3.3)
LYMPHOCYTES # BLD AUTO: 32 % — SIGNIFICANT CHANGE UP (ref 13–44)
MAGNESIUM SERPL-MCNC: 2.2 MG/DL — SIGNIFICANT CHANGE UP (ref 1.6–2.6)
MANUAL SMEAR VERIFICATION: SIGNIFICANT CHANGE UP
MCHC RBC-ENTMCNC: 29.6 PG — SIGNIFICANT CHANGE UP (ref 27–34)
MCHC RBC-ENTMCNC: 33.9 GM/DL — SIGNIFICANT CHANGE UP (ref 32–36)
MCV RBC AUTO: 87.3 FL — SIGNIFICANT CHANGE UP (ref 80–100)
MONOCYTES # BLD AUTO: 0.39 K/UL — SIGNIFICANT CHANGE UP (ref 0–0.9)
MONOCYTES NFR BLD AUTO: 5 % — SIGNIFICANT CHANGE UP (ref 2–14)
MYELOCYTES NFR BLD: 1 % — HIGH (ref 0–0)
NEUTROPHILS # BLD AUTO: 4.32 K/UL — SIGNIFICANT CHANGE UP (ref 1.8–7.4)
NEUTROPHILS NFR BLD AUTO: 56 % — SIGNIFICANT CHANGE UP (ref 43–77)
NRBC # BLD: 0 /100 — SIGNIFICANT CHANGE UP (ref 0–0)
PHOSPHATE SERPL-MCNC: 3.6 MG/DL — SIGNIFICANT CHANGE UP (ref 2.5–4.5)
PLAT MORPH BLD: NORMAL — SIGNIFICANT CHANGE UP
PLATELET # BLD AUTO: 322 K/UL — SIGNIFICANT CHANGE UP (ref 150–400)
PLATELET COUNT - ESTIMATE: NORMAL — SIGNIFICANT CHANGE UP
POIKILOCYTOSIS BLD QL AUTO: SLIGHT — SIGNIFICANT CHANGE UP
POLYCHROMASIA BLD QL SMEAR: SLIGHT — SIGNIFICANT CHANGE UP
POTASSIUM SERPL-MCNC: 3.7 MMOL/L — SIGNIFICANT CHANGE UP (ref 3.5–5.3)
POTASSIUM SERPL-SCNC: 3.7 MMOL/L — SIGNIFICANT CHANGE UP (ref 3.5–5.3)
PROT SERPL-MCNC: 6.7 G/DL — SIGNIFICANT CHANGE UP (ref 6–8.3)
PROTHROM AB SERPL-ACNC: 13.7 SEC — HIGH (ref 10.6–13.6)
RBC # BLD: 4.8 M/UL — SIGNIFICANT CHANGE UP (ref 4.2–5.8)
RBC # FLD: 13.4 % — SIGNIFICANT CHANGE UP (ref 10.3–14.5)
RBC BLD AUTO: ABNORMAL
SODIUM SERPL-SCNC: 135 MMOL/L — SIGNIFICANT CHANGE UP (ref 135–145)
VARIANT LYMPHS # BLD: 6 % — SIGNIFICANT CHANGE UP (ref 0–6)
WBC # BLD: 7.72 K/UL — SIGNIFICANT CHANGE UP (ref 3.8–10.5)
WBC # FLD AUTO: 7.72 K/UL — SIGNIFICANT CHANGE UP (ref 3.8–10.5)

## 2021-03-16 PROCEDURE — 96374 THER/PROPH/DIAG INJ IV PUSH: CPT

## 2021-03-16 PROCEDURE — 84145 PROCALCITONIN (PCT): CPT

## 2021-03-16 PROCEDURE — 85610 PROTHROMBIN TIME: CPT

## 2021-03-16 PROCEDURE — 82728 ASSAY OF FERRITIN: CPT

## 2021-03-16 PROCEDURE — 99238 HOSP IP/OBS DSCHRG MGMT 30/<: CPT

## 2021-03-16 PROCEDURE — 82565 ASSAY OF CREATININE: CPT

## 2021-03-16 PROCEDURE — 84100 ASSAY OF PHOSPHORUS: CPT

## 2021-03-16 PROCEDURE — 85379 FIBRIN DEGRADATION QUANT: CPT

## 2021-03-16 PROCEDURE — 82962 GLUCOSE BLOOD TEST: CPT

## 2021-03-16 PROCEDURE — 82607 VITAMIN B-12: CPT

## 2021-03-16 PROCEDURE — 85730 THROMBOPLASTIN TIME PARTIAL: CPT

## 2021-03-16 PROCEDURE — 85652 RBC SED RATE AUTOMATED: CPT

## 2021-03-16 PROCEDURE — 84484 ASSAY OF TROPONIN QUANT: CPT

## 2021-03-16 PROCEDURE — 84443 ASSAY THYROID STIM HORMONE: CPT

## 2021-03-16 PROCEDURE — 71045 X-RAY EXAM CHEST 1 VIEW: CPT

## 2021-03-16 PROCEDURE — 93005 ELECTROCARDIOGRAM TRACING: CPT

## 2021-03-16 PROCEDURE — U0005: CPT

## 2021-03-16 PROCEDURE — 83036 HEMOGLOBIN GLYCOSYLATED A1C: CPT

## 2021-03-16 PROCEDURE — 87635 SARS-COV-2 COVID-19 AMP PRB: CPT

## 2021-03-16 PROCEDURE — 83615 LACTATE (LD) (LDH) ENZYME: CPT

## 2021-03-16 PROCEDURE — 85025 COMPLETE CBC W/AUTO DIFF WBC: CPT

## 2021-03-16 PROCEDURE — 80053 COMPREHEN METABOLIC PANEL: CPT

## 2021-03-16 PROCEDURE — 86769 SARS-COV-2 COVID-19 ANTIBODY: CPT

## 2021-03-16 PROCEDURE — 99285 EMERGENCY DEPT VISIT HI MDM: CPT

## 2021-03-16 PROCEDURE — 80076 HEPATIC FUNCTION PANEL: CPT

## 2021-03-16 PROCEDURE — 80061 LIPID PANEL: CPT

## 2021-03-16 PROCEDURE — 83735 ASSAY OF MAGNESIUM: CPT

## 2021-03-16 PROCEDURE — 86140 C-REACTIVE PROTEIN: CPT

## 2021-03-16 PROCEDURE — 36415 COLL VENOUS BLD VENIPUNCTURE: CPT

## 2021-03-16 RX ORDER — ASPIRIN/CALCIUM CARB/MAGNESIUM 324 MG
1 TABLET ORAL
Qty: 30 | Refills: 0
Start: 2021-03-16 | End: 2021-04-14

## 2021-03-16 RX ORDER — ISOPROPYL ALCOHOL, BENZOCAINE .7; .06 ML/ML; ML/ML
1 SWAB TOPICAL
Qty: 100 | Refills: 1
Start: 2021-03-16 | End: 2021-05-04

## 2021-03-16 RX ORDER — INSULIN LISPRO 100/ML
16 VIAL (ML) SUBCUTANEOUS
Qty: 1440 | Refills: 0
Start: 2021-03-16 | End: 2021-04-14

## 2021-03-16 RX ORDER — INSULIN GLARGINE 100 [IU]/ML
40 INJECTION, SOLUTION SUBCUTANEOUS
Qty: 1200 | Refills: 0
Start: 2021-03-16 | End: 2021-04-14

## 2021-03-16 RX ORDER — ENOXAPARIN SODIUM 100 MG/ML
40 INJECTION SUBCUTANEOUS
Qty: 5 | Refills: 0
Start: 2021-03-16 | End: 2021-04-14

## 2021-03-16 RX ORDER — PNEUMOCOCCAL 13-VALENT CONJUGATE VACCINE 2.2; 2.2; 2.2; 2.2; 2.2; 4.4; 2.2; 2.2; 2.2; 2.2; 2.2; 2.2; 2.2 UG/.5ML; UG/.5ML; UG/.5ML; UG/.5ML; UG/.5ML; UG/.5ML; UG/.5ML; UG/.5ML; UG/.5ML; UG/.5ML; UG/.5ML; UG/.5ML; UG/.5ML
0.5 INJECTION, SUSPENSION INTRAMUSCULAR ONCE
Refills: 0 | Status: COMPLETED | OUTPATIENT
Start: 2021-03-16 | End: 2021-03-16

## 2021-03-16 RX ORDER — INSULIN GLARGINE 100 [IU]/ML
34 INJECTION, SOLUTION SUBCUTANEOUS EVERY MORNING
Refills: 0 | Status: DISCONTINUED | OUTPATIENT
Start: 2021-03-16 | End: 2021-03-16

## 2021-03-16 RX ORDER — METFORMIN HYDROCHLORIDE 850 MG/1
1 TABLET ORAL
Qty: 0 | Refills: 0 | DISCHARGE

## 2021-03-16 RX ADMIN — Medication 12 UNIT(S): at 08:26

## 2021-03-16 RX ADMIN — Medication 12 UNIT(S): at 11:59

## 2021-03-16 RX ADMIN — Medication 6 MILLIGRAM(S): at 07:02

## 2021-03-16 RX ADMIN — REMDESIVIR 500 MILLIGRAM(S): 5 INJECTION INTRAVENOUS at 11:58

## 2021-03-16 RX ADMIN — INSULIN GLARGINE 34 UNIT(S): 100 INJECTION, SOLUTION SUBCUTANEOUS at 08:25

## 2021-03-16 RX ADMIN — Medication 3: at 11:58

## 2021-03-16 RX ADMIN — PANTOPRAZOLE SODIUM 40 MILLIGRAM(S): 20 TABLET, DELAYED RELEASE ORAL at 07:02

## 2021-03-16 RX ADMIN — ENOXAPARIN SODIUM 40 MILLIGRAM(S): 100 INJECTION SUBCUTANEOUS at 07:02

## 2021-03-16 RX ADMIN — Medication 2: at 08:26

## 2021-03-16 NOTE — DISCHARGE NOTE PROVIDER - NSDCMRMEDTOKEN_GEN_ALL_CORE_FT
metFORMIN 500 mg oral tablet: 1 tab(s) orally 2 times a day   Aspirin Enteric Coated 81 mg oral delayed release tablet: 1 tab(s) orally once a day   Basaglar KwikPen 100 units/mL subcutaneous solution: 40 unit(s) subcutaneous once a day (in the morning)   dexamethasone 6 mg oral tablet: 1 tab(s) orally once a day  insulin lispro 100 units/mL injectable solution: 16 unit(s) injectable 3 times a day (before meals)   alcohol swabs : Apply topically to affected area 4 times a day   Aspirin Enteric Coated 81 mg oral delayed release tablet: 1 tab(s) orally once a day   Basaglar KwikPen 100 units/mL subcutaneous solution: 40 unit(s) subcutaneous once a day (in the morning)   dexamethasone 6 mg oral tablet: 1 tab(s) orally once a day  glucometer (per patient&#x27;s insurance): Test blood sugars four times a day. Dispense #1 glucometer.  insulin lispro 100 units/mL injectable solution: 16 unit(s) injectable 3 times a day (before meals)  Insulin Pen Needles, 4mm: 1 application subcutaneously 4 times a day. ** Use with insulin pen **   lancets: 1 application subcutaneously 4 times a day   test strips (per patient&#x27;s insurance): 1 application subcutaneously 4 times a day. ** Compatible with patient&#x27;s glucometer **  U-100 Insulin Syringe, 1/2 mL: 1 application subcutaneously 2 times a day ** 1/2 mL holds up to 50 units of insulin **

## 2021-03-16 NOTE — PROGRESS NOTE ADULT - NSHPATTENDINGPLANDISCUSS_GEN_ALL_CORE
[facility]    [address] [address]    Phone:  [phone]           Purnima Macias   MRN: [id number]    Department:  [facility]   Date of Visit:  1/15/2017           Diagnosis     Finger laceration, initial encounter        You were seen by Delia Tabares MD.      Disclaimer     Follow-up Care:  It is your responsibility to arrange for follow-up care with your healthcare provider or as instructed. Call to get an appointment time.           Contact your doctor for follow-up appointment if not already scheduled.     Follow up with Robby Ramos MD.    Specialty:  Family Practice    Comments:  As needed, or if signs of infection develop    Contact information    [address]  [address] [place] [id number] [phone]        Preventive care and screening     Your blood pressure was 133/67 today. If your blood pressure is higher than 120/80, we recommend follow up with your primary care provider to obtain basic health screening, including reassessment of your blood pressure, within three months.          Medications you received while in the ED through 01/15/2017  7:27 PM     None         What to Do with Your Medications      Notice     No changes were made to your prescriptions during this visit.            Your To Do List     Future Appointments Provider Department Dept Phone    1/30/2017 8:30 AM Robby Ramos MD [facility] [phone]    4/21/2017 7:50 AM [facility] [phone]    4/25/2017 12:15 PM CASPER Crawford [facility] [phone]      Procedures     None      Imaging Results     None      Discharge Instructions     None      Discharge References/Attachments     LACERATION, SMALL OR SUPERFICIAL: NOT SUTURED (ENGLISH)      Medication Safety: What you need to know     {art goals [id number]}        
Medical Resident, Dr. Sanchez
Medical resident, Dr. Sanchez
patient, resident
Medical Resident, Dr. Sanchez

## 2021-03-16 NOTE — PROGRESS NOTE ADULT - PROBLEM SELECTOR PLAN 1
- p/w fever, cough, SOB x2wk   - COVID PCR positive  - trop neg  - d-dimer 185  - CXR b/l infiltrates  - c/w oxygen supp, remdesivir, decadrone  - monitor oxygen sat  - plan to titrate off oxygen requirement
- p/w fever, cough, SOB x2wk   - COVID PCR positive  - trop neg  - d-dimer 185  - CXR b/l infiltrates  - c/w oxygen supp, remdesivir (4 of 5), decadron (5 of 10)  - monitor oxygen sat & ambulatory O2 sat  - plan to titrate off oxygen requirement
- p/w fever, cough, SOB x2wk   - COVID PCR positive  - trop neg  - d-dimer 185  - CXR b/l infiltrates  - c/w oxygen supp, remdesivir, decadron  - monitor oxygen sat  - plan to titrate off oxygen as tolerated and obtain ambulatory O2 once on room air
- p/w fever, cough, SOB x2wk   - COVID PCR positive  - trop neg  - d-dimer 185  - CXR b/l infiltrates  - c/w oxygen supp, remdesivir (4 of 5), decadron (5 of 10)  - monitor oxygen sat & ambulatory O2 sat  - plan to titrate off oxygen requirement
- p/w fever, cough, SOB x2wk   - COVID PCR positive  - trop neg  - d-dimer 185  - CXR b/l infiltrates  - c/w oxygen supp, remdesivir, decadron  - monitor oxygen sat  - plan to titrate off oxygen requirement  - f/u procal

## 2021-03-16 NOTE — DIETITIAN INITIAL EVALUATION ADULT. - OTHER INFO
Pt lives home with family PTA, alert, oriented, COVID19+stacey, in airborne/contact isolation room, unable to conduct a face to face interview due to limited contact restrictions related to pt's medical condition and isolation precautions. spoke to pt via phone ( [phone]), well-communicated; appetite good, intended wt loss 60 to 70 lb x 4 yo 7m with healthy eating, active life style changes per pt, denied GI distress, chewing or swallowing problem at present, no specific food choices reported; h/o DM not long ( ? details), HgbA1C=>15.5, Finger stick flnfn=526 to 333, on steroid Rx, Endocrinology noted, will be on Insulin per MD; Written copy (Consistent CHO, Healthy Plate) given to pt via RN 3/15/2021, pt very receptive to discussion over phone today for diet education, encouraged healthy food choices with portion control, consistent CHO intake, wt loss, addition written copy ( Nutrition Label Reading, Wt Loss Tips, Cooking for Wt Management, Fast Food Tips), RD business card attached for further contact as needed Pt lives home with family PTA, alert, oriented, COVID19+stacey, in airborne/contact isolation room, unable to conduct a face to face interview due to limited contact restrictions related to pt's medical condition and isolation precautions. spoke to pt via phone ( [phone]), well-communicated; appetite good, intended wt loss 60 to 70 lb x 4 yo 7m with healthy eating, active life style changes per pt, denied GI distress, chewing or swallowing problem at present, no specific food choices reported; h/o DM not long ( ? details), HgbA1C=>15.5, Finger stick xapzx=605 to 333, on steroid Rx, Endocrinology noted, will be on Insulin per MD; Written copy (Consistent CHO, Healthy Plate) given to pt via RN 3/15/2021, pt very receptive to discussion over phone today for diet education, encouraged healthy food choices with portion control, consistent CHO intake, wt loss, addition written copy given ( via RN): Nutrition Label Reading, Wt Loss Tips, Cooking for Wt Management, Fast Food Tips), RD business card attached for further contact as needed

## 2021-03-16 NOTE — DIETITIAN INITIAL EVALUATION ADULT. - NS FNS REASON FOR WEIGHT CHANG
heathy eating, active life style: lost 60 to 70 lb x 6 to 7m heathy eating, active life style: lost 60 to 70 lb ( 16 to 19%) x 6 to 7m

## 2021-03-16 NOTE — PROGRESS NOTE ADULT - PROVIDER SPECIALTY LIST ADULT
Endocrinology
Internal Medicine
Endocrinology
Internal Medicine

## 2021-03-16 NOTE — DIETITIAN INITIAL EVALUATION ADULT. - PERTINENT LABORATORY DATA
03-16 Na135 mmol/L Glu 220 mg/dL<H> K+ 3.7 mmol/L Cr  0.90 mg/dL BUN 20 mg/dL<H>   03-16 Phos 3.6 mg/dL   03-16 Alb 2.4 g/dL<L>       03-12 Chol 172 mg/dL LDL --    HDL 40 mg/dL<L> Trig 160 mg/dL<H>  03-12-21 @ 10:22 HgbA1C >15.5 [4.0 - 5.6]

## 2021-03-16 NOTE — DIETITIAN INITIAL EVALUATION ADULT. - PROBLEM SELECTOR PLAN 3
RISK                                                          Points  [  ] Previous VTE                                                3  [  ] Thrombophilia                                             2  [  ] Lower limb paralysis                                   2        (unable to hold up >15 seconds)    [  ] Current Cancer                                             2         (within 6 months)  [ x ] Immobilization > 24 hrs                              1  [  ] ICU/CCU stay > 24 hours                             1  [ x ] Age > 60                                                         1    IMPROVE VTE Score: 2  lovenox for VTE prophylaxis. plan per MD

## 2021-03-16 NOTE — DIETITIAN INITIAL EVALUATION ADULT. - PERTINENT MEDS FT
MEDICATIONS  (STANDING):  dexAMETHasone  Injectable 6 milliGRAM(s) IV Push daily  enoxaparin Injectable 40 milliGRAM(s) SubCutaneous every 12 hours  influenza   Vaccine 0.5 milliLiter(s) IntraMuscular once  insulin glargine Injectable (LANTUS) 34 Unit(s) SubCutaneous every morning  insulin lispro (ADMELOG) corrective regimen sliding scale   SubCutaneous three times a day before meals  insulin lispro Injectable (ADMELOG) 12 Unit(s) SubCutaneous three times a day before meals  pantoprazole    Tablet 40 milliGRAM(s) Oral before breakfast  pneumococcal  13 Vaccine (PREVNAR 13) 0.5 milliLiter(s) IntraMuscular once  remdesivir  IVPB   IV Intermittent   remdesivir  IVPB 100 milliGRAM(s) IV Intermittent every 24 hours

## 2021-03-16 NOTE — PROGRESS NOTE ADULT - PROBLEM SELECTOR PLAN 2
- h/o DM, on metformin at home   - h1c >15.5  - c/w ISS, lantus 34u qHs, and admelog 10u tid  - monitor bG    - endo, Dr. Collier, onboard
- h/o DM, on metformin at home   - c/w ISS  - monitor bG  - f/u HbA1C
- h/o DM, on metformin at home   - c/w ISS  - monitor bG  - f/u HbA1C
- h/o DM, on metformin at home   - h1c >15.5  - c/w ISS, lantus 34u qHs, and admelog 10u tid  - monitor bG    - endo, Dr. Collier, onboard
- h/o DM, on metformin at home   - h1c >15.5  - c/w ISS, lantus 8u qHs, and admelog 3u tid  - monitor bG

## 2021-03-16 NOTE — PROGRESS NOTE ADULT - REASON FOR ADMISSION
acute hypoxic respiratory failure
fever, cough
fever, cough
acute hypoxic respiratory failure
Acute hypoxic respiratory failure

## 2021-03-16 NOTE — PROGRESS NOTE ADULT - PROBLEM SELECTOR PLAN 3
RISK                                                          Points  [  ] Previous VTE                                                3  [  ] Thrombophilia                                             2  [  ] Lower limb paralysis                                   2        (unable to hold up >15 seconds)    [  ] Current Cancer                                             2         (within 6 months)  [ x ] Immobilization > 24 hrs                              1  [  ] ICU/CCU stay > 24 hours                             1  [ x ] Age > 60                                                         1    IMPROVE VTE Score: 2  lovenox 40mg bid for VTE prophylaxis.

## 2021-03-16 NOTE — DISCHARGE NOTE PROVIDER - HOSPITAL COURSE
Patient is a 40 year old male, with a PMHX of DM, presents to the ED with complaints of fever and cough x1wk. Admitted for acute hypoxic respiratory failure. Patient started on oxygen supplementation. Patient was treated with Remdesivir and Decadron. Patient saturate above 90% on ambulation.     Patient has a history of diabetes, on metformin at home. His HbA1c noted to be >15.5%. He was started on Basal/bolus insulin. Endocrinologist, Dr. Collier, was consulted. Patient will be discharged with insulin and direction to follow up with Endocrinologist as outpatient. Patient is a 40 year old male, with a PMHX of DM, presents to the ED with complaints of fever and cough x1wk. Admitted for acute hypoxic respiratory failure. Patient started on oxygen supplementation. Patient was treated with Remdesivir and Decadron. Patient saturate above 90% on ambulation.     Patient has a history of diabetes, on metformin at home. His HbA1c noted to be >15.5%. He was started on Basal/bolus insulin. Endocrinologist, Dr. Collier, was consulted. Patient will be discharged with insulin and direction to follow up with Endocrinologist, Dr. Collier, as outpatient. Patient is a 40 year old male, with a PMHX of DM, presents to the ED with complaints of fever and cough x1wk. Admitted for acute hypoxic respiratory failure. Patient started on oxygen supplementation. Patient was treated with Remdesivir and Decadron. Patient saturate above 90% on ambulation. Patient will be discharged with Decadon for 4 more days and aspirin 81mg for 30 days.    Patient has a history of diabetes, on metformin at home. His HbA1c noted to be >15.5%. He was started on Basal/bolus insulin. Endocrinologist, Dr. Collier, was consulted. Patient will be discharged with insulin and direction to follow up with Endocrinologist, Dr. Collier, as outpatient.

## 2021-03-16 NOTE — DIETITIAN INITIAL EVALUATION ADULT. - PROBLEM SELECTOR PLAN 1
p/w fever and cough   COVID positive  CXR shows b/l infiltrates(f/u official read)  Troponin negative  D-dimer wnl  Saturating 96% on 2L NC  started on Decadron  f/u Procalcitonin, CRP, ferritin  Monitor oxygen saturation plan per MD

## 2021-03-16 NOTE — DISCHARGE NOTE PROVIDER - CARE PROVIDER_API CALL
Tamika Collier)  Internal Medicine  [address] [place] [address]  Phone: [phone]  Fax: [phone]  Follow Up Time: 2 weeks

## 2021-03-16 NOTE — PROGRESS NOTE ADULT - ATTENDING COMMENTS
Patient seen and examined this morning, plan of care discussed w/ medical team. Patient is a 39 y/o male with a PMHX of poorly controlled DM admitted for acute hypoxic respiratory failure 2/2 COVID pneumonia. He has completed remdesivir and dexamethasone, has been titrated off oxygen. . DM very poorly controlled, A1C > 15.5, was only on metformin on home, Endocrine consulted and started on insulin. Consulted Nutrition as well.  He has acceptable oxygen sat on R/a and is comfortable  Xarelto 10mg x 30 days on discharge.  f/u Dr. Collier.
Patient seen and examined this morning, plan of care discussed w/ medical team. Patient is a 41 y/o male with a PMHX of poorly controlled DM admitted for acute hypoxic respiratory failure 2/2 COVID pneumonia. Continue remdesivir day 4 and dexamethasone day 5, titrate off oxygen as tolerated. DM very poorly controlled, A1C > 15.5, was only on metformin on home, Endocrine consulted and started on insulin. Consulted Nutrition as well. Will ambulate on room air to determine if he qualifies for home O2. Would send w/ Xarelto 10mg x 30 days on discharge.    Rest as per resident's note.
Patient seen and examined this morning, plan of care discussed w/ medical team. Patient admitted for acute hypoxic respiratory failure secondary to COVID pneumonia, already on O2 and Dexamethasone, will start on Remdesivir as well. Follow up COVID Ab, covid markers, and titrate off oxygen as tolerated. Increase lovenox to q12h dose per COVID protocol.    Rest as per resident's note.
Patient seen and examined this morning, plan of care discussed w/ medical team. Patient is a 40 yr male with a PMHX of DM admitted for acute hypoxic respiratory failure 2/2 COVID pneumonia. Continue remdesivir day 3 and dexamethasone day 4, titrate off oxygen as tolerated. DM very poorly controlled, A1C > 15.5, was only on metformin on home, Endocrine consulted and started on insulin. Consult Nutrition. Continues to spike fevers, likely secondary to COVID infection, will repeat CXR and repeat procal, if no change in CXR and procal wnl hold Abx.    Rest as per resident's note.

## 2021-03-16 NOTE — PROGRESS NOTE ADULT - SUBJECTIVE AND OBJECTIVE BOX
MEDICAL ATTENDING NOTE  Patient is a 40y old  Male who presents with a chief complaint of fever, cough (16 Mar 2021 14:08)      HPI:  40 yr male with a PMHX of DM and no PSHx presents to the ED with complaints of fever and cough for x1 week. Patient states he saw his PMD today, where he was noted hypoxic to 86% on room air and had a negative rapid COVID test in the office. Pt denies any chest pain, N/V/D, abdominal pain, urinary symptoms or any other acute complaints.    In ED, /80, HR 75, SaO2 96% on 2L NC (11 Mar 2021 20:09)      INTERVAL HPI/OVERNIGHT EVENTS: no new complaints    MEDICATIONS  (STANDING):  dexAMETHasone  Injectable 6 milliGRAM(s) IV Push daily  enoxaparin Injectable 40 milliGRAM(s) SubCutaneous every 12 hours  influenza   Vaccine 0.5 milliLiter(s) IntraMuscular once  insulin glargine Injectable (LANTUS) 34 Unit(s) SubCutaneous every morning  insulin lispro (ADMELOG) corrective regimen sliding scale   SubCutaneous three times a day before meals  insulin lispro Injectable (ADMELOG) 12 Unit(s) SubCutaneous three times a day before meals  pantoprazole    Tablet 40 milliGRAM(s) Oral before breakfast    MEDICATIONS  (PRN):  acetaminophen   Tablet .. 650 milliGRAM(s) Oral every 6 hours PRN Temp greater or equal to 38.5C (101.3F)      __________________________________________________  REVIEW OF SYSTEMS:    CONSTITUTIONAL: No fever,   EYES: no acute visual disturbances  NECK: No pain or stiffness  RESPIRATORY: No cough; No shortness of breath  CARDIOVASCULAR: No chest pain, no palpitations  GASTROINTESTINAL: No pain. No nausea or vomiting; No diarrhea   NEUROLOGICAL: No headache or numbness, no tremors  MUSCULOSKELETAL: No joint pain, no muscle pain  GENITOURINARY: no dysuria, no frequency, no hesitancy  PSYCHIATRY: no depression , no anxiety  ALL OTHER  ROS negative        Vital Signs Last 24 Hrs  T(C): 36.5 (16 Mar 2021 14:36), Max: 36.9 (15 Mar 2021 21:59)  T(F): 97.7 (16 Mar 2021 14:36), Max: 98.5 (15 Mar 2021 21:59)  HR: 90 (16 Mar 2021 14:36) (82 - 90)  BP: 124/79 (16 Mar 2021 14:36) (123/77 - 135/78)  BP(mean): --  RR: 18 (16 Mar 2021 14:36) (18 - 18)  SpO2: 91% (16 Mar 2021 14:36) (91% - 92%)    ________________________________________________  PHYSICAL EXAM:  GENERAL: NAD  HEENT: Normocephalic;  conjunctivae and sclerae clear; moist mucous membranes;   NECK : supple  CHEST/LUNG: Clear to auscultation bilaterally with good air entry   HEART: S1 S2  regular; no murmurs, gallops or rubs  ABDOMEN: Soft, Nontender, Nondistended; Bowel sounds present  EXTREMITIES: no cyanosis; no edema; no calf tenderness  SKIN: warm and dry; no rash  NERVOUS SYSTEM:  Awake and alert; Oriented  to place, person and time ; no new deficits    _________________________________________________  LABS:                        14.2   7.72  )-----------( 322      ( 16 Mar 2021 06:28 )             41.9     03-16    135  |  99  |  20<H>  ----------------------------<  220<H>  3.7   |  26  |  0.90    Ca    9.4      16 Mar 2021 06:28  Phos  3.6     03-16  Mg     2.2     03-16    TPro  6.7  /  Alb  2.4<L>  /  TBili  0.3  /  DBili  x   /  AST  35  /  ALT  58  /  AlkPhos  49  03-16    PT/INR - ( 16 Mar 2021 11:00 )   PT: 13.7 sec;   INR: 1.16 ratio         PTT - ( 15 Mar 2021 06:16 )  PTT:33.4 sec    CAPILLARY BLOOD GLUCOSE      POCT Blood Glucose.: 296 mg/dL (16 Mar 2021 11:43)  POCT Blood Glucose.: 226 mg/dL (16 Mar 2021 08:04)  POCT Blood Glucose.: 206 mg/dL (15 Mar 2021 22:14)

## 2021-03-16 NOTE — PROGRESS NOTE ADULT - SUBJECTIVE AND OBJECTIVE BOX
Interval Events:    tolerating po intake  poc glucose elevated prandials > fasting    Allergies    No Known Allergies    Intolerances      Endocrine/Metabolic Medications:  dexAMETHasone  Injectable 6 milliGRAM(s) IV Push daily  insulin glargine Injectable (LANTUS) 34 Unit(s) SubCutaneous every morning  insulin lispro (ADMELOG) corrective regimen sliding scale   SubCutaneous three times a day before meals  insulin lispro Injectable (ADMELOG) 12 Unit(s) SubCutaneous three times a day before meals      Vital Signs Last 24 Hrs  T(C): 36.7 (16 Mar 2021 05:00), Max: 36.9 (15 Mar 2021 21:59)  T(F): 98.1 (16 Mar 2021 05:00), Max: 98.5 (15 Mar 2021 21:59)  HR: 86 (16 Mar 2021 05:00) (82 - 87)  BP: 135/78 (16 Mar 2021 05:00) (123/77 - 141/81)  BP(mean): --  RR: 18 (16 Mar 2021 05:00) (18 - 18)  SpO2: 92% (16 Mar 2021 05:00) (91% - 92%)      PHYSICAL EXAM    Constitutional:    NC/AT:    HEENT:    Neck:  No JVD    Respiratory:  reduced breath sounds b/l bases    Cardiovascular:  RR without murmur    Gastrointestinal: Soft    Extremities: without cyanosis    Neurological:  non focal    LABS                        14.2   7.72  )-----------( 322      ( 16 Mar 2021 06:28 )             41.9                               135    |  99     |  20                  Calcium: 9.4   / iCa: x      (03-16 @ 06:28)    ----------------------------<  220       Magnesium: 2.2                              3.7     |  26     |  0.90             Phosphorous: 3.6      TPro  6.7    /  Alb  2.4    /  TBili  0.3    /  DBili  x      /  AST  35     /  ALT  58     /  AlkPhos  49     16 Mar 2021 06:28    CAPILLARY BLOOD GLUCOSE      POCT Blood Glucose.: 296 mg/dL (16 Mar 2021 11:43)  POCT Blood Glucose.: 226 mg/dL (16 Mar 2021 08:04)  POCT Blood Glucose.: 206 mg/dL (15 Mar 2021 22:14)  POCT Blood Glucose.: 297 mg/dL (15 Mar 2021 16:55)        Assesment/plan          41 yo male with h/o DM type 2, admitted with fever, cough    Endocrinology consulted for glycemic management    DM type 2  uncontrolled  complicated by hyperglycemia, high dose steroid use, acute COVID 19+  HbA1C: 15%  home regimen:  metformin 500mg bid    recommendations:  increase basal/bolus if remains inpatient  - insulin lantus 40 units daily in am  - insulin lispro 16 units pre meals  - continue current sliding scale  reassess based on requirements  goal inpatient glucose range 140-180mg/dl    tentative discharge regimen  needs insulin teaching prior to discharge- discussed with patient  advised would need basal/bolus- 4 insulin injections a day  lantus 40 units daily  lispro 16 units pre meals  need for outpatient endo follow up  insulin pen use demonstrated    acute hypoxic respiratory failure  acute COVID 19  on supplemental O2, dexamethasone, remdesivir    morbid obesity  acute COVID 19 on dexamethasone  contributing to insulin resistance  would benefit from GLP1 agonist as outpatient  glycemic management    Discussed with patient and primary team  Please call Endocrine- [phone]- Dr Tamika Collier as needed

## 2021-03-16 NOTE — DISCHARGE NOTE PROVIDER - NSDCCPCAREPLAN_GEN_ALL_CORE_FT
PRINCIPAL DISCHARGE DIAGNOSIS  Diagnosis: Acute respiratory failure with hypoxia  Assessment and Plan of Treatment: You presented with SOB for a week due to COVID PNA  - CXR showed bilateral infiltration, cosistent with COVID infection  - you were treated with Remdesivir and decadron, along with oxygen supplementation  - please quarantine yourself for another 10 day        SECONDARY DISCHARGE DIAGNOSES  Diagnosis: Diabetes  Assessment and Plan of Treatment: You have a history of Diabetes, on Metformin at home  - Your HbA1c was noted to be >15.5%  - we have started you on Insulin therapy, and you will be discharged with insulin  - be compliant with your medication regimen  - you are advised to eat low carbohydrate diet   - exercise/Walk  5 days/week for atleast 30 minutes as tolerated   - observe your feet for any ulcer, open wound or cut daily   - you need eye exam once a year with your eye doctor   - please follow up with endocrinologist in 1-2 weeks       PRINCIPAL DISCHARGE DIAGNOSIS  Diagnosis: Acute respiratory failure with hypoxia  Assessment and Plan of Treatment: You presented with SOB for a week due to COVID PNA  - CXR showed bilateral infiltration, cosistent with COVID infection  - you were treated with Remdesivir and decadron, along with oxygen supplementation  - please quarantine yourself for another 14 days  - you will be discharged with Decadone for 4 more days and aspirin 81mg for 30 days.        SECONDARY DISCHARGE DIAGNOSES  Diagnosis: Diabetes  Assessment and Plan of Treatment: You have a history of Diabetes, on Metformin at home  - Your HbA1c was noted to be >15.5%  - we have started you on Insulin therapy, and you will be discharged with insulin  - be compliant with your medication regimen  - you are advised to eat low carbohydrate diet   - exercise/Walk  5 days/week for atleast 30 minutes as tolerated   - observe your feet for any ulcer, open wound or cut daily   - you need eye exam once a year with your eye doctor   - please follow up with endocrinologist in 1-2 weeks       PRINCIPAL DISCHARGE DIAGNOSIS  Diagnosis: Acute respiratory failure with hypoxia  Assessment and Plan of Treatment: You presented with SOB for a week due to COVID PNA  - CXR showed bilateral infiltration, cosistent with COVID infection  - you were treated with Remdesivir and decadron, along with oxygen supplementation  - please quarantine yourself for another 14 days  - you will be discharged with Decadone for 4 more days and aspirin 81mg for 30 days.        SECONDARY DISCHARGE DIAGNOSES  Diagnosis: Diabetes  Assessment and Plan of Treatment: You have a history of Diabetes, on Metformin at home  - Your HbA1c was noted to be >15.5%  - we have started you on Insulin therapy, and you will be discharged with insulin  - be compliant with your medication regimen  - you are advised to eat low carbohydrate diet   - exercise/Walk  5 days/week for atleast 30 minutes as tolerated   - observe your feet for any ulcer, open wound or cut daily   - you need eye exam once a year with your eye doctor   - please follow up with endocrinologist, Dr. Collier, in 1-2 weeks

## 2021-03-16 NOTE — DISCHARGE NOTE NURSING/CASE MANAGEMENT/SOCIAL WORK - PATIENT PORTAL LINK FT
You can access the FollowMyHealth Patient Portal offered by [facility] by registering at the following website: [email or web address]. By joining [facility]’s FollowMyHealth portal, you will also be able to view your health information using other applications (apps) compatible with our system.

## 2021-03-17 RX ORDER — DEXAMETHASONE 0.5 MG/5ML
1 ELIXIR ORAL
Qty: 4 | Refills: 0
Start: 2021-03-17 | End: 2021-03-20

## 2022-05-25 NOTE — PATIENT PROFILE ADULT - PRO INTERPRETER NEED 2
[facility]    History of Present Illness   Raghavendra Jacob is a 56 BABAR FRASER DO         SYSTEM ID:  [id number]   BABAR FRASER DO         SYSTEM ID:  [id number]   MARCY BLACKBURN MD         SYSTEM ID:  [id number]   MARCY BLACKBURN MD         SYSTEM ID:  [id number]   with staff, Dr. Michel.    Jaimie Sharma MD  Surgery Resident, [facility]   English

## 2022-09-08 NOTE — H&P ADULT - PROBLEM SELECTOR PLAN 3
English RISK                                                          Points  [  ] Previous VTE                                                3  [  ] Thrombophilia                                             2  [  ] Lower limb paralysis                                   2        (unable to hold up >15 seconds)    [  ] Current Cancer                                             2         (within 6 months)  [ x ] Immobilization > 24 hrs                              1  [  ] ICU/CCU stay > 24 hours                             1  [ x ] Age > 60                                                         1    IMPROVE VTE Score: 2  lovenox for VTE prophylaxis.

## 2024-02-27 NOTE — PROGRESS NOTE ADULT - ASSESSMENT
Spoke to patients mother - advised  advised hives are unlikely to be associated with Diazepam in his experience advised monitor pt for now - and if no improvement to f/u w PCP. Mom expressed understanding, and is currently in a appt with PCP.   40 yr male with a PMHX of DM and no PSHx presents to the ED with complaints of fever and cough for x1 week and admitted for acute hypoxic respiratory failure 2/2 COVID pneumonia.

## 2024-07-29 NOTE — ED PROVIDER NOTE - NS ED SCRIBE STATEMENT
Pt stated [facility] stated that Dr. Grove didn't allow for more refills of Eliquis. Pt is running low and is asking for understanding regarding refills.     Please follow up  [phone]     Attending